# Patient Record
Sex: MALE | Race: WHITE | NOT HISPANIC OR LATINO | Employment: OTHER | URBAN - METROPOLITAN AREA
[De-identification: names, ages, dates, MRNs, and addresses within clinical notes are randomized per-mention and may not be internally consistent; named-entity substitution may affect disease eponyms.]

---

## 2017-01-06 RX ORDER — LISINOPRIL 20 MG/1
20 TABLET ORAL DAILY
COMMUNITY

## 2017-01-06 RX ORDER — CLOPIDOGREL BISULFATE 75 MG/1
75 TABLET ORAL DAILY
COMMUNITY
End: 2017-01-09

## 2017-01-06 RX ORDER — ASPIRIN 325 MG
325 TABLET ORAL DAILY
COMMUNITY
End: 2017-01-09

## 2017-01-09 ENCOUNTER — OFFICE VISIT (OUTPATIENT)
Dept: CARDIAC SURGERY | Facility: CLINIC | Age: 57
End: 2017-01-09

## 2017-01-09 VITALS
DIASTOLIC BLOOD PRESSURE: 86 MMHG | TEMPERATURE: 97.9 F | RESPIRATION RATE: 20 BRPM | OXYGEN SATURATION: 96 % | SYSTOLIC BLOOD PRESSURE: 144 MMHG | WEIGHT: 259.2 LBS | HEART RATE: 86 BPM | HEIGHT: 73 IN | BODY MASS INDEX: 34.35 KG/M2

## 2017-01-09 DIAGNOSIS — I25.118 CORONARY ARTERY DISEASE OF NATIVE ARTERY OF NATIVE HEART WITH STABLE ANGINA PECTORIS (HCC): Primary | ICD-10-CM

## 2017-01-09 PROCEDURE — 99205 OFFICE O/P NEW HI 60 MIN: CPT | Performed by: THORACIC SURGERY (CARDIOTHORACIC VASCULAR SURGERY)

## 2017-01-09 RX ORDER — ROSUVASTATIN CALCIUM 10 MG/1
TABLET, COATED ORAL
Refills: 3 | COMMUNITY
Start: 2016-11-17 | End: 2017-01-09

## 2017-01-09 RX ORDER — LISINOPRIL 20 MG/1
20 TABLET ORAL
COMMUNITY
End: 2017-01-09 | Stop reason: ALTCHOICE

## 2017-01-09 RX ORDER — CHLORAL HYDRATE 500 MG
CAPSULE ORAL
COMMUNITY
End: 2022-02-08

## 2017-01-09 RX ORDER — OXYCODONE AND ACETAMINOPHEN 10; 325 MG/1; MG/1
TABLET ORAL
Refills: 0 | COMMUNITY
Start: 2016-12-18 | End: 2017-01-09

## 2017-01-09 RX ORDER — DOXEPIN HYDROCHLORIDE 6 MG/1
TABLET ORAL
Refills: 2 | COMMUNITY
Start: 2016-12-16

## 2017-01-09 RX ORDER — CLOPIDOGREL BISULFATE 75 MG/1
75 TABLET ORAL
COMMUNITY

## 2017-01-09 RX ORDER — ROSUVASTATIN CALCIUM 20 MG/1
20 TABLET, COATED ORAL
COMMUNITY

## 2017-01-09 RX ORDER — PRAVASTATIN SODIUM 20 MG
TABLET ORAL
Refills: 2 | COMMUNITY
Start: 2016-12-16 | End: 2017-01-09

## 2017-01-09 RX ORDER — MELOXICAM 7.5 MG/1
TABLET ORAL
Refills: 2 | COMMUNITY
Start: 2016-12-16 | End: 2017-01-09

## 2017-01-09 RX ORDER — METOPROLOL TARTRATE 100 MG/1
100 TABLET ORAL
COMMUNITY

## 2017-01-09 RX ORDER — MELOXICAM 15 MG/1
15 TABLET ORAL
COMMUNITY
Start: 2015-08-06

## 2017-01-11 ENCOUNTER — TELEPHONE (OUTPATIENT)
Dept: CARDIAC SURGERY | Facility: CLINIC | Age: 57
End: 2017-01-11

## 2017-01-11 NOTE — TELEPHONE ENCOUNTER
Called pt and left message to call us regarding his meds prior to surgery.      According to meds in EPIC:  Plavix--last dose 1/12/17  Lisinopril--last dose 1/16/2017  Meloxicam--last dose 1/12/2017  Fish oils--stop now

## 2017-01-16 ENCOUNTER — HOSPITAL ENCOUNTER (OUTPATIENT)
Dept: CARDIOLOGY | Facility: HOSPITAL | Age: 57
Discharge: HOME | End: 2017-01-16
Attending: THORACIC SURGERY (CARDIOTHORACIC VASCULAR SURGERY) | Admitting: THORACIC SURGERY (CARDIOTHORACIC VASCULAR SURGERY)

## 2017-01-16 ENCOUNTER — TELEPHONE (OUTPATIENT)
Dept: CARDIAC SURGERY | Facility: CLINIC | Age: 57
End: 2017-01-16

## 2017-01-16 LAB
ABO + RH BLD: NORMAL
ALBUMIN SERPL-MCNC: 3.9 G/DL (ref 3.5–4.8)
ALBUMIN/GLOB SERPL: 1.5 {RATIO} (ref 1–1.7)
ALP SERPL-CCNC: 131 IU/L (ref 32–91)
ALT SERPL-CCNC: 37 IU/L (ref 17–63)
ANION GAP SERPL CALC-SCNC: 12.6 MMOL/L (ref 10–20)
APTT BLD: 25.9 SEC (ref 24–31)
ARMBAND: NORMAL
AST SERPL-CCNC: 31 IU/L (ref 15–41)
BACTERIA SPEC AEROBE CULT: NORMAL
BASE EXCESS BLDA CALC-SCNC: 2.3 MMOL/L (ref 0–3)
BASOPHILS # BLD AUTO: 0 10*3/UL (ref 0–0.2)
BASOPHILS NFR BLD AUTO: 1 % (ref 0–2)
BILIRUB SERPL-MCNC: 0.5 MG/DL (ref 0.3–1.2)
BILIRUB UR QL STRIP: NEGATIVE MG/DL
BLD COMPONENT TYPE: NORMAL
BLD GP AB SCN SERPL QL: NEGATIVE
BPU ID: NORMAL
BPU ID: NORMAL
BUN SERPL-MCNC: 10 MG/DL (ref 8–20)
BUN/CREAT SERPL: 12.5 (ref 6.2–20.3)
CALCIUM SERPL-MCNC: 10 MG/DL (ref 8.9–10.3)
CASTS URNS QL MICRO: ABNORMAL /[LPF]
CHLORIDE SERPL-SCNC: 98 MMOL/L (ref 101–111)
CO2 BLDA-SCNC: 27.9 MMOL/L (ref 22–29)
COLOR UR: YELLOW
CONV ADP AGGREGATION, % PLATELET: 94 % (ref 86–100)
CONV ALLEN'S TEST: POSITIVE
CONV BACTERIA IN URINE MICRO: NEGATIVE
CONV CLARITY OF URINE: CLEAR
CONV CO2: 29 MMOL/L (ref 22–32)
CONV DEVICE: ABNORMAL
CONV HEMODILUTION: NO
CONV HYALINE CASTS IN URINE MICRO: 0 /[LPF] (ref 0–5)
CONV PRODUCT 1 STATUS: NORMAL
CONV PRODUCT 1 STATUS: NORMAL
CONV PROTEIN IN URINE BY AUTOMATED TEST STRIP: NEGATIVE MG/DL
CONV SITE: ABNORMAL
CONV SMALL ROUND CELLS: ABNORMAL /[HPF]
CONV TOTAL PROTEIN: 6.5 G/DL (ref 6.1–7.9)
CONV UROBILINOGEN IN URINE BY AUTOMATED TEST STRIP: 1 MG/DL
CREAT UR-MCNC: 0.8 MG/DL (ref 0.7–1.2)
CROSSMATCH EXPIRATION: NORMAL
CROSSMATCH INTERPRETATION: NORMAL
CROSSMATCH INTERPRETATION: NORMAL
CULTURE INDICATED?: ABNORMAL
DIFFERENTIAL METHOD BLD: (no result)
EOSINOPHIL # BLD AUTO: 0 10*3/UL (ref 0–0.3)
EOSINOPHIL # BLD AUTO: 1 % (ref 0–3)
ERYTHROCYTE [DISTWIDTH] IN BLOOD BY AUTOMATED COUNT: 13 % (ref 11.5–14.5)
GLOBULIN UR ELPH-MCNC: 2.6 G/DL (ref 2.5–3.8)
GLUCOSE SERPL-MCNC: 298 MG/DL (ref 65–99)
GLUCOSE UR QL: >1000 MG/DL
HCO3 BLDA-SCNC: 26.7 MMOL/L (ref 21–28)
HCT VFR BLD AUTO: 49.3 % (ref 40–54)
HGB BLD-MCNC: 16.8 G/DL (ref 14–18)
HGB UR QL STRIP: NEGATIVE
INR PPP: 0.8
KETONES UR QL STRIP: NEGATIVE MG/DL
LEUKOCYTE ESTERASE UR QL STRIP: NEGATIVE
LYMPHOCYTES # BLD AUTO: 1.5 10*3/UL (ref 0.8–4.8)
LYMPHOCYTES NFR BLD AUTO: 37 % (ref 18–42)
Lab: NORMAL
MCH RBC QN AUTO: 31.6 PG (ref 26–32)
MCHC RBC AUTO-ENTMCNC: 34.2 G/DL (ref 32–36)
MCV RBC AUTO: 92.5 FL (ref 80–94)
MICRO REPORT STATUS: NORMAL
MONOCYTES # BLD AUTO: 0.3 10*3/UL (ref 0.1–1.3)
MONOCYTES NFR BLD AUTO: 6 % (ref 2–11)
NEUTROPHILS # BLD AUTO: 2.3 10*3/UL (ref 2.3–8.6)
NEUTROPHILS NFR BLD AUTO: 55 % (ref 50–75)
NITRITE UR QL STRIP: NEGATIVE
NRBC BLD AUTO-RTO: 0 /100{WBCS}
NRBC/RBC NFR BLD MANUAL: 0 10*3/UL
NUM BPU REQUESTED: 2
PCO2 BLDA: 39.9 MM[HG] (ref 35–48)
PH BLDA: 7.43 [PH] (ref 7.35–7.45)
PH UR STRIP.AUTO: 6 [PH] (ref 4.5–8)
PLATELET # BLD AUTO: 136 10*3/UL (ref 150–450)
PMV BLD AUTO: 8.4 FL (ref 7.4–10.4)
PO2 BLD: 53.2 MM[HG] (ref 83–108)
POTASSIUM SERPL-SCNC: 4.6 MMOL/L (ref 3.6–5.1)
PRE-HGB: 16.8 MG/DL
PREALB SERPL-MCNC: NORMAL MG/DL (ref 16–38)
PROTHROMBIN TIME: 10.9 SEC (ref 9.6–11.7)
RBC # BLD AUTO: 5.33 10*6/UL (ref 4.6–6)
RBC #/AREA URNS HPF: 1 /[HPF] (ref 0–3)
SAO2 % BLDCOA: 88 % (ref 94–98)
SODIUM SERPL-SCNC: 135 MMOL/L (ref 136–144)
SP GR UR: 1.04 (ref 1–1.03)
SPECIMEN SOURCE: ABNORMAL
SPECIMEN SOURCE: NORMAL
SPECIMEN TYPE: ABNORMAL
SPERM URNS QL MICRO: ABNORMAL /[HPF]
SQUAMOUS SPT QL MICRO: 0 /[HPF] (ref 0–5)
TRANS STATUS: NORMAL
TRANS STATUS: NORMAL
UNIDENT CRYS URNS QL MICRO: ABNORMAL /[HPF]
UNIT DIVISION: 0
UNIT DIVISION: 0
WBC # BLD AUTO: 4.2 10*3/UL (ref 4.5–11.5)
WBC #/AREA URNS HPF: 0 /[HPF] (ref 0–5)
YEAST SPEC QL WET PREP: ABNORMAL /[HPF]

## 2017-01-16 NOTE — TELEPHONE ENCOUNTER
Dr Wallace called to make DR White that this patient had a 15cm lung nodule . It was seen on imaging in 2015 and 2016 he feels it is most likely benign but wanted to make sure Dr Rivera was aware of it and suggested he might want to have a CT of the chest completed prior to surgery. I forwarded this message to Dr White

## 2017-01-17 ENCOUNTER — TELEPHONE (OUTPATIENT)
Dept: CARDIAC SURGERY | Facility: CLINIC | Age: 57
End: 2017-01-17

## 2017-01-17 NOTE — TELEPHONE ENCOUNTER
Mr Nuñez called back to let us know he wanted to reschedule the CT test scheduled for today. He can have it completed either Thur or Friday of this week. We will attempt to reschedule and call him back. He wants to see Dr White in office to discuss after the test has been completed. We will schedule an office appointment after the testing has been scheduled

## 2017-01-17 NOTE — TELEPHONE ENCOUNTER
Dr White asked that we obtain a CT of the chest for this patient after he heard from Dr Wallace yesterday. I scheduled the test then called the patient to confirm the time. I spoke to his wife at first and she agreed the time was suitable we were then disconnected. I called back Mr Nuñez answered and stated that he had several questions he wanted Dr White to answer. I asked if I could forward them to Dr White and the patient replied that he did not want surgery at this time and would not be having surgery until after the first. I was asking him to clarify that he did not want surgery at this time and he hung up while I was talking.

## 2017-01-18 ENCOUNTER — TELEPHONE (OUTPATIENT)
Dept: CARDIAC SURGERY | Facility: CLINIC | Age: 57
End: 2017-01-18

## 2017-01-18 NOTE — TELEPHONE ENCOUNTER
I called to let Mr Nuñez know his CT is scheduled for 1/19/17 at 12;30 and he will follow up with Dr White in office 1/30/17 at 12:00. These times were agreeable to him

## 2017-01-19 ENCOUNTER — HOSPITAL ENCOUNTER (OUTPATIENT)
Dept: GENERAL RADIOLOGY | Facility: HOSPITAL | Age: 57
Discharge: HOME | End: 2017-01-19
Attending: THORACIC SURGERY (CARDIOTHORACIC VASCULAR SURGERY) | Admitting: THORACIC SURGERY (CARDIOTHORACIC VASCULAR SURGERY)

## 2017-01-31 ENCOUNTER — OFFICE VISIT (OUTPATIENT)
Dept: CARDIAC SURGERY | Facility: CLINIC | Age: 57
End: 2017-01-31

## 2017-01-31 VITALS
HEART RATE: 86 BPM | DIASTOLIC BLOOD PRESSURE: 92 MMHG | SYSTOLIC BLOOD PRESSURE: 136 MMHG | WEIGHT: 256.2 LBS | BODY MASS INDEX: 33.95 KG/M2 | RESPIRATION RATE: 20 BRPM | HEIGHT: 73 IN | TEMPERATURE: 98 F | OXYGEN SATURATION: 96 %

## 2017-01-31 DIAGNOSIS — I25.118 CORONARY ARTERY DISEASE INVOLVING NATIVE CORONARY ARTERY OF NATIVE HEART WITH OTHER FORM OF ANGINA PECTORIS (HCC): Primary | ICD-10-CM

## 2017-01-31 PROCEDURE — 99212 OFFICE O/P EST SF 10 MIN: CPT | Performed by: THORACIC SURGERY (CARDIOTHORACIC VASCULAR SURGERY)

## 2017-01-31 RX ORDER — OXYCODONE AND ACETAMINOPHEN 10; 325 MG/1; MG/1
TABLET ORAL
Refills: 0 | COMMUNITY
Start: 2017-01-17 | End: 2017-03-23 | Stop reason: ALTCHOICE

## 2017-01-31 NOTE — PROGRESS NOTES
Mr. Nuñez's preop Cxray showed a left lung calcified nodule and a small RUL noncalcified nodule. Subsequent CT scan confirms these findings. I explained to him that he may require another CT scan in 6 months.    AVSS and WNL    NCAT  CTA B/L  RRR, no murmur  Obese  GCS 15, alert and oriented x3    We plan to perform his coronary artery bypass next Monday 2/6/17. He is currently on Plavix, which we instructed him to hold beginning Thursday 2/2/17.

## 2017-01-31 NOTE — MR AVS SNAPSHOT
Christopher Nuñez   2017 12:45 PM   Office Visit    Dept Phone:  [phone]   Encounter #:  [id number]    Provider:  Byron White MD   Department:  [facility] CARDIAC SURGERY                Your Full Care Plan              Your Updated Medication List          This list is accurate as of: 17  3:18 PM.  Always use your most recent med list.                aspirin 81 MG tablet       clopidogrel 75 MG tablet   Commonly known as:  PLAVIX       fish oil 1000 MG capsule capsule       lisinopril 20 MG tablet   Commonly known as:  PRINIVIL,ZESTRIL       meloxicam 15 MG tablet   Commonly known as:  MOBIC       metoprolol tartrate 100 MG tablet   Commonly known as:  LOPRESSOR       oxyCODONE-acetaminophen  MG per tablet   Commonly known as:  PERCOCET       rosuvastatin 20 MG tablet   Commonly known as:  CRESTOR       SILENOR 6 MG tablet   Generic drug:  Doxepin HCl               You Were Diagnosed With        Codes Comments    Coronary artery disease involving native coronary artery of native heart with other form of angina pectoris    -  Primary ICD-10-CM: I25.118       Instructions     None    Patient Instructions History      Upcoming Appointments     Visit Type Date Time Department    OFFICE VISIT 2017 12:45 PM [facility] [place]      AssertID Signup     [facility] AssertID allows you to send messages to your doctor, view your test results, renew your prescriptions, schedule appointments, and more. To sign up, go to Interview and click on the Sign Up Now link in the New User? box. Enter your Assert[employer] Activation Code exactly as it appears below along with the last four digits of your Social Security Number and your Date of Birth () to complete the sign-up process. If you do not sign up before the expiration date, you must request a new code.    AssertID Activation Code: [email or web address]  Expires: 2017  3:18 PM    If you have  "questions, you can email [email or web address] or call [phone] to talk to our MyChart staff. Remember, [employer] is NOT to be used for urgent needs. For medical emergencies, dial 911.               Other Info from Your Visit           Allergies     Ascorbate      Morphine      Morphine And Related        Vital Signs     Blood Pressure Pulse Temperature Respirations Height Weight    136/92 (BP Location: Right arm, Patient Position: Sitting, Cuff Size: Adult) 86 98 °F (36.7 °C) (Oral) 20 73\" (185.4 cm) 256 lb 3.2 oz (116 kg)    Oxygen Saturation Body Mass Index Smoking Status             96% 33.8 kg/m2 Current Every Day Smoker         Problems and Diagnoses Noted     Coronary artery disease involving native coronary artery of native heart with other form of angina pectoris    -  Primary        "

## 2017-02-01 ENCOUNTER — TELEPHONE (OUTPATIENT)
Dept: CARDIAC SURGERY | Facility: CLINIC | Age: 57
End: 2017-02-01

## 2017-02-01 NOTE — PROGRESS NOTES
BCS CONSULT    Reason for Consultation:Surgical revascularization    History of Present Illness:     This is a pleasant 56 year old man with a well-established history of CAD, having undergone multiple PCI procedures (most recently 2008). He has suffered recurrent, reproducible anginal symptoms (substernal discomfort) as well as dyspnea and dizziness over the last three months.    LHC on 12/29/16 by Dr. Pandey showed progressive CAD, with a 100% RCA lesion, a 90% OM lesion, a 70% Ramus lesion and an 80% in-stent LAD lesion. The patient has classic three vessel CAD and has been referred for possible surgical revascularization.                                                   Review of Systems   Constitutional: Positive for activity change and fatigue. Negative for appetite change, chills and diaphoresis.   HENT: Negative for hearing loss, nosebleeds, postnasal drip, rhinorrhea, sore throat, trouble swallowing and voice change.    Eyes: Negative for visual disturbance.   Respiratory: Positive for chest tightness and shortness of breath. Negative for apnea, cough, choking, wheezing and stridor.    Cardiovascular: Positive for chest pain and leg swelling. Negative for palpitations.   Gastrointestinal: Negative for abdominal distention, abdominal pain, anal bleeding, blood in stool, constipation, nausea, rectal pain and vomiting.   Endocrine: Negative for cold intolerance and heat intolerance.   Genitourinary: Negative for dysuria, flank pain, frequency and urgency.   Musculoskeletal: Negative for arthralgias, back pain, gait problem, joint swelling, myalgias, neck pain and neck stiffness.   Skin: Negative for pallor.   Allergic/Immunologic: Negative for environmental allergies, food allergies and immunocompromised state.   Neurological: Positive for dizziness and weakness. Negative for tremors, seizures, syncope, speech difficulty, light-headedness, numbness and headaches.   Hematological: Negative for adenopathy. Does  "not bruise/bleed easily.   Psychiatric/Behavioral: Negative for agitation, behavioral problems, confusion, decreased concentration, dysphoric mood and hallucinations. The patient is not hyperactive.         Past Medical History   Diagnosis Date   • COPD (chronic obstructive pulmonary disease)    • Coronary artery disease    • Diabetes mellitus    • Dyslipidemia    • Hypertension    • Myocardial infarction    • Shoulder pain    • Syncope      Past Surgical History   Procedure Laterality Date   • Coronary angioplasty     • Hernia repair       Family History   Problem Relation Age of Onset   • Cancer Mother    • Dementia Father      Social History   Substance Use Topics   • Smoking status: Current Every Day Smoker   • Smokeless tobacco: None   • Alcohol use No       (Not in a hospital admission)    Allergies:  Ascorbate; Morphine; and Morphine and related    Objective      Vital Signs       Flowsheet Rows         First Filed Value    Admission Height  73\" (185.4 cm) Documented at 01/09/2017 1231    Admission Weight  259 lb 3.2 oz (118 kg) Documented at 01/09/2017 1231        73\" (185.4 cm)    Physical Exam   Constitutional: He is oriented to person, place, and time. He appears well-developed and well-nourished. No distress.   HENT:   Head: Normocephalic and atraumatic.   Mouth/Throat: No oropharyngeal exudate.   Eyes: Conjunctivae and EOM are normal. Pupils are equal, round, and reactive to light. No scleral icterus.   Neck: Normal range of motion. Neck supple. No JVD present. No tracheal deviation present. No thyromegaly present.   Cardiovascular: Normal rate, regular rhythm and normal heart sounds.  Exam reveals no gallop and no friction rub.    Pulses:       Radial pulses are 2+ on the right side, and 2+ on the left side.        Femoral pulses are 2+ on the right side, and 2+ on the left side.       Dorsalis pedis pulses are 1+ on the right side, and 1+ on the left side.   Pulmonary/Chest: Effort normal. He has " rales.   Abdominal: Soft. Bowel sounds are normal. He exhibits no mass. No hernia.   Obese   Musculoskeletal: Normal range of motion. He exhibits no edema or deformity.   Lymphadenopathy:     He has no cervical adenopathy.   Neurological: He is alert and oriented to person, place, and time. No cranial nerve deficit. Coordination normal. GCS eye subscore is 4. GCS verbal subscore is 5. GCS motor subscore is 6.   Skin: Skin is warm and dry. No rash noted. He is not diaphoretic. No erythema. No pallor.   Psychiatric: He has a normal mood and affect. His behavior is normal. Judgment and thought content normal.       Results Review:       Assessment/Plan:     55 year old man with progressive CAD, having undergone multiple PCIs in past. He qualifies for surgical revascularization. We will perform this on an elective basis in early 2017.      Byron White MD  02/01/17  12:48 PM

## 2017-02-06 ENCOUNTER — OUTSIDE FACILITY SERVICE (OUTPATIENT)
Dept: CARDIAC SURGERY | Facility: CLINIC | Age: 57
End: 2017-02-06

## 2017-02-06 ENCOUNTER — PREP FOR SURGERY (OUTPATIENT)
Dept: CARDIAC SURGERY | Facility: CLINIC | Age: 57
End: 2017-02-06

## 2017-02-06 DIAGNOSIS — I25.118 CORONARY ARTERY DISEASE OF NATIVE ARTERY OF NATIVE HEART WITH STABLE ANGINA PECTORIS (HCC): Primary | ICD-10-CM

## 2017-02-06 PROCEDURE — 76998 US GUIDE INTRAOP: CPT | Performed by: THORACIC SURGERY (CARDIOTHORACIC VASCULAR SURGERY)

## 2017-02-06 PROCEDURE — 33533 CABG ARTERIAL SINGLE: CPT | Performed by: THORACIC SURGERY (CARDIOTHORACIC VASCULAR SURGERY)

## 2017-02-06 PROCEDURE — 33508 ENDOSCOPIC VEIN HARVEST: CPT | Performed by: THORACIC SURGERY (CARDIOTHORACIC VASCULAR SURGERY)

## 2017-02-06 PROCEDURE — 33519 CABG ARTERY-VEIN THREE: CPT | Performed by: THORACIC SURGERY (CARDIOTHORACIC VASCULAR SURGERY)

## 2017-03-09 ENCOUNTER — HOSPITAL ENCOUNTER (OUTPATIENT)
Dept: PAIN MEDICINE | Facility: HOSPITAL | Age: 57
Discharge: HOME | End: 2017-03-09
Attending: PHYSICAL MEDICINE & REHABILITATION | Admitting: PHYSICAL MEDICINE & REHABILITATION

## 2017-03-23 ENCOUNTER — OFFICE VISIT (OUTPATIENT)
Dept: CARDIAC SURGERY | Facility: CLINIC | Age: 57
End: 2017-03-23

## 2017-03-23 VITALS
DIASTOLIC BLOOD PRESSURE: 86 MMHG | HEART RATE: 90 BPM | TEMPERATURE: 98 F | SYSTOLIC BLOOD PRESSURE: 138 MMHG | RESPIRATION RATE: 20 BRPM | BODY MASS INDEX: 34.01 KG/M2 | WEIGHT: 256.6 LBS | OXYGEN SATURATION: 95 % | HEIGHT: 73 IN

## 2017-03-23 DIAGNOSIS — Z95.1 H/O CORONARY ARTERY BYPASS SURGERY: Primary | ICD-10-CM

## 2017-03-23 PROCEDURE — 99024 POSTOP FOLLOW-UP VISIT: CPT | Performed by: THORACIC SURGERY (CARDIOTHORACIC VASCULAR SURGERY)

## 2017-03-23 NOTE — PROGRESS NOTES
Christopher Nuñez is a 56 y.o. male s/p CABG. He denies any signs or symptoms of myocardial ischemia, cerebrovascular event, or infection. He reports good exercise tolerance.    Sternum is stable, incision is clean, dry, and intact.   CTA B/L.   Lower extremity incisions are clean, dry and intact.  GCS 15, no neurologic deficit.    He appears to be doing well. Follow up with us will be on a PRN basis.

## 2017-06-06 ENCOUNTER — HOSPITAL ENCOUNTER (OUTPATIENT)
Dept: PAIN MEDICINE | Facility: HOSPITAL | Age: 57
Discharge: HOME | End: 2017-06-06
Attending: PHYSICAL MEDICINE & REHABILITATION | Admitting: PHYSICAL MEDICINE & REHABILITATION

## 2017-06-19 ENCOUNTER — OFFICE VISIT (OUTPATIENT)
Dept: CARDIAC SURGERY | Facility: CLINIC | Age: 57
End: 2017-06-19

## 2017-06-19 DIAGNOSIS — T81.32XA STERNAL WOUND DEHISCENCE, INITIAL ENCOUNTER: ICD-10-CM

## 2017-06-19 DIAGNOSIS — Z95.1 H/O CORONARY ARTERY BYPASS SURGERY: Primary | ICD-10-CM

## 2017-06-19 PROCEDURE — 99215 OFFICE O/P EST HI 40 MIN: CPT | Performed by: THORACIC SURGERY (CARDIOTHORACIC VASCULAR SURGERY)

## 2017-07-31 ENCOUNTER — HOSPITAL ENCOUNTER (OUTPATIENT)
Dept: PAIN MEDICINE | Facility: HOSPITAL | Age: 57
Discharge: HOME | End: 2017-07-31
Attending: PHYSICAL MEDICINE & REHABILITATION | Admitting: PHYSICAL MEDICINE & REHABILITATION

## 2017-07-31 ENCOUNTER — TELEPHONE (OUTPATIENT)
Dept: CARDIAC SURGERY | Facility: CLINIC | Age: 57
End: 2017-07-31

## 2017-07-31 NOTE — TELEPHONE ENCOUNTER
I have spoken to Mrs Nuñez several times in the last few days concerning her husbands sternal wires. Dr White has planned to remove the broken wires but recently patient had an ER visit and more wires are fractured. They have been asking if Dr White would be willing to move up the surgery date. He has considered this and will plan on surgery 8/4/17 which the family is agreeable to

## 2017-07-31 NOTE — TELEPHONE ENCOUNTER
Called [place] [employer] @ [phone] and spoke with Blaire BENITES. She stated that CPT code 75298 does not require auth for outpatient or observation up to 72 hours. There was not a reference number given for the call as the name will suffice.

## 2017-08-01 ENCOUNTER — TELEPHONE (OUTPATIENT)
Dept: CARDIAC SURGERY | Facility: CLINIC | Age: 57
End: 2017-08-01

## 2017-08-01 NOTE — TELEPHONE ENCOUNTER
I spoke with Mrs Nuñez 7/31/17 and let her know her  needed to stop his Plavix today. He will be advised when to restart it after his scheduled surgery on 8/4/17

## 2017-08-03 ENCOUNTER — HOSPITAL ENCOUNTER (OUTPATIENT)
Dept: PREADMISSION TESTING | Facility: HOSPITAL | Age: 57
Discharge: HOME | End: 2017-08-03
Attending: THORACIC SURGERY (CARDIOTHORACIC VASCULAR SURGERY) | Admitting: THORACIC SURGERY (CARDIOTHORACIC VASCULAR SURGERY)

## 2017-08-03 LAB
ABO + RH BLD: NORMAL
ALBUMIN SERPL-MCNC: 3.9 G/DL (ref 3.5–4.8)
ALBUMIN/GLOB SERPL: 1.4 {RATIO} (ref 1–1.7)
ALP SERPL-CCNC: 107 IU/L (ref 32–91)
ALT SERPL-CCNC: 29 IU/L (ref 17–63)
ANION GAP SERPL CALC-SCNC: 12.5 MMOL/L (ref 10–20)
APTT BLD: 24 SEC (ref 24–31)
ARMBAND: NORMAL
AST SERPL-CCNC: 28 IU/L (ref 15–41)
BACTERIA SPEC AEROBE CULT: NORMAL
BASOPHILS # BLD AUTO: 0 10*3/UL (ref 0–0.2)
BASOPHILS NFR BLD AUTO: 1 % (ref 0–2)
BILIRUB SERPL-MCNC: 0.7 MG/DL (ref 0.3–1.2)
BILIRUB UR QL STRIP: NEGATIVE MG/DL
BLD COMPONENT TYPE: NORMAL
BLD GP AB SCN SERPL QL: NEGATIVE
BUN SERPL-MCNC: 10 MG/DL (ref 8–20)
BUN/CREAT SERPL: 12.5 (ref 6.2–20.3)
CALCIUM SERPL-MCNC: 9.9 MG/DL (ref 8.9–10.3)
CASTS URNS QL MICRO: ABNORMAL /[LPF]
CHLORIDE SERPL-SCNC: 102 MMOL/L (ref 101–111)
COLOR UR: YELLOW
CONV ADP AGGREGATION, % PLATELET: 88 % (ref 86–100)
CONV BACTERIA IN URINE MICRO: NEGATIVE
CONV CLARITY OF URINE: CLEAR
CONV CO2: 25 MMOL/L (ref 22–32)
CONV HYALINE CASTS IN URINE MICRO: 0 /[LPF] (ref 0–5)
CONV PROTEIN IN URINE BY AUTOMATED TEST STRIP: NEGATIVE MG/DL
CONV SMALL ROUND CELLS: ABNORMAL /[HPF]
CONV TOTAL PROTEIN: 6.7 G/DL (ref 6.1–7.9)
CONV UROBILINOGEN IN URINE BY AUTOMATED TEST STRIP: 0.2 MG/DL
CREAT UR-MCNC: 0.8 MG/DL (ref 0.7–1.2)
CROSSMATCH EXPIRATION: NORMAL
CULTURE INDICATED?: ABNORMAL
DIFFERENTIAL METHOD BLD: (no result)
EOSINOPHIL # BLD AUTO: 0 10*3/UL (ref 0–0.3)
EOSINOPHIL # BLD AUTO: 1 % (ref 0–3)
ERYTHROCYTE [DISTWIDTH] IN BLOOD BY AUTOMATED COUNT: 13.7 % (ref 11.5–14.5)
GLOBULIN UR ELPH-MCNC: 2.8 G/DL (ref 2.5–3.8)
GLUCOSE SERPL-MCNC: 222 MG/DL (ref 65–99)
GLUCOSE UR QL: >1000 MG/DL
HCT VFR BLD AUTO: 49.1 % (ref 40–54)
HGB BLD-MCNC: 16.8 G/DL (ref 14–18)
HGB UR QL STRIP: NEGATIVE
INR PPP: 0.9
KETONES UR QL STRIP: NEGATIVE MG/DL
LEUKOCYTE ESTERASE UR QL STRIP: NEGATIVE
LYMPHOCYTES # BLD AUTO: 1.5 10*3/UL (ref 0.8–4.8)
LYMPHOCYTES NFR BLD AUTO: 34 % (ref 18–42)
Lab: NORMAL
MCH RBC QN AUTO: 31.8 PG (ref 26–32)
MCHC RBC AUTO-ENTMCNC: 34.2 G/DL (ref 32–36)
MCV RBC AUTO: 93 FL (ref 80–94)
MICRO REPORT STATUS: NORMAL
MONOCYTES # BLD AUTO: 0.2 10*3/UL (ref 0.1–1.3)
MONOCYTES NFR BLD AUTO: 5 % (ref 2–11)
NEUTROPHILS # BLD AUTO: 2.6 10*3/UL (ref 2.3–8.6)
NEUTROPHILS NFR BLD AUTO: 59 % (ref 50–75)
NITRITE UR QL STRIP: NEGATIVE
NRBC BLD AUTO-RTO: 0 /100{WBCS}
NRBC/RBC NFR BLD MANUAL: 0 10*3/UL
PH UR STRIP.AUTO: 5.5 [PH] (ref 4.5–8)
PLATELET # BLD AUTO: 132 10*3/UL (ref 150–450)
PMV BLD AUTO: 8 FL (ref 7.4–10.4)
POTASSIUM SERPL-SCNC: 4.5 MMOL/L (ref 3.6–5.1)
PROTHROMBIN TIME: 10.5 SEC (ref 9.6–11.7)
RBC # BLD AUTO: 5.28 10*6/UL (ref 4.6–6)
RBC #/AREA URNS HPF: 1 /[HPF] (ref 0–3)
SODIUM SERPL-SCNC: 135 MMOL/L (ref 136–144)
SP GR UR: 1.04 (ref 1–1.03)
SPECIMEN SOURCE: ABNORMAL
SPECIMEN SOURCE: NORMAL
SPERM URNS QL MICRO: ABNORMAL /[HPF]
SQUAMOUS SPT QL MICRO: 0 /[HPF] (ref 0–5)
UNIDENT CRYS URNS QL MICRO: ABNORMAL /[HPF]
WBC # BLD AUTO: 4.4 10*3/UL (ref 4.5–11.5)
WBC #/AREA URNS HPF: 1 /[HPF] (ref 0–5)
YEAST SPEC QL WET PREP: ABNORMAL /[HPF]

## 2017-08-04 ENCOUNTER — OUTSIDE FACILITY SERVICE (OUTPATIENT)
Dept: CARDIAC SURGERY | Facility: CLINIC | Age: 57
End: 2017-08-04

## 2017-08-04 ENCOUNTER — HOSPITAL ENCOUNTER (OUTPATIENT)
Dept: PREOP | Facility: HOSPITAL | Age: 57
Setting detail: HOSPITAL OUTPATIENT SURGERY
Discharge: HOME | End: 2017-08-04
Attending: THORACIC SURGERY (CARDIOTHORACIC VASCULAR SURGERY) | Admitting: THORACIC SURGERY (CARDIOTHORACIC VASCULAR SURGERY)

## 2017-08-04 ENCOUNTER — TELEPHONE (OUTPATIENT)
Dept: CARDIAC SURGERY | Facility: CLINIC | Age: 57
End: 2017-08-04

## 2017-08-04 LAB — GLUCOSE BLD-MCNC: 176 MG/DL (ref 70–105)

## 2017-08-04 PROCEDURE — OUTSIDEPOS PR OUTSIDE POS PLACEHOLDER: Performed by: THORACIC SURGERY (CARDIOTHORACIC VASCULAR SURGERY)

## 2017-08-04 NOTE — TELEPHONE ENCOUNTER
After speaking with Chichi I called and discussed patient recent surgery with Cira GODFREY. I then called Mrs Nuñez who was upset because she felt they had been given conflicting information. Dr White will see patient back in office in 3 weeks, I scheduled a post op visit 8/28/17. I explained that Dr White completed the surgery with hopes that the lower portion of the sternum will heal. He would like to see patient back in 3 weeks to see if this is the case. He did explain the importance of following sternal precautions for that period of time and let the patient know that it was possible  the sternum might not heal properly and sternal plating might be a possibility in the future. Mrs Nuñez verbalized understanding but felt this was not what they had been told immediately following surgery by one of the nurses.The family did request a note for patients job which Cira GODFREY provided stating that the patient was not to work until seen after his 3 week post op appointment

## 2017-08-04 NOTE — PROGRESS NOTES
BCS CONSULT    Reason for Consultation: Sternal dehiscence.    History of Present Illness:     This is a 56 year old diabetic man with COPD, well known to our service, having undergone a CABG x 4 (with LIMA to LAD) on 2/6/17. His initial postop course appeared uneventful, despite the patient himself admitting to not following sternal precautions. He was re-advised to exercise sternal precautions, including not lifting anything over twenty pounds, until his sternal tenderness/pain had completely subsided.     Unfortunately, he continued to fail to protect his sternum. After working on his car engine, he felt a pop and now has severe lower sternal pain. He can feel his sternum moving inferiorly, especially when he coughs. His incision is intact, and there is no evidence of infection.    CT scan shows no underlying collection, no evidence of infection. The lower sternum is dehisced. Both CT and CXray confirm fracture of the lower sternal wires.    Review of Systems   Constitutional: Positive for activity change and appetite change. Negative for diaphoresis and fatigue.   HENT: Negative for hearing loss, nosebleeds, postnasal drip, rhinorrhea and trouble swallowing.    Eyes: Negative for visual disturbance.   Respiratory: Negative for apnea, cough, choking, chest tightness, shortness of breath, wheezing and stridor.    Cardiovascular: Negative for chest pain, palpitations and leg swelling.   Gastrointestinal: Negative for abdominal distention, abdominal pain, constipation, diarrhea, nausea and vomiting.   Genitourinary: Negative for dysuria.   Musculoskeletal: Negative for back pain, myalgias, neck pain and neck stiffness.   Skin: Negative for pallor, rash and wound.   Allergic/Immunologic: Negative for environmental allergies, food allergies and immunocompromised state.   Neurological: Negative for tremors, seizures, syncope, facial asymmetry, speech difficulty, weakness, light-headedness, numbness and headaches.    Hematological: Negative for adenopathy. Does not bruise/bleed easily.   Psychiatric/Behavioral: Positive for behavioral problems and sleep disturbance. Negative for confusion, self-injury and suicidal ideas. The patient is not nervous/anxious.         Past Medical History:   Diagnosis Date   • COPD (chronic obstructive pulmonary disease)    • Coronary artery disease    • Diabetes mellitus    • Dyslipidemia    • Hypertension    • Myocardial infarction    • Shoulder pain    • Syncope      Past Surgical History:   Procedure Laterality Date   • CORONARY ANGIOPLASTY     • CORONARY ARTERY BYPASS GRAFT  02/06/2017    x4  Dr White   • HERNIA REPAIR       Family History   Problem Relation Age of Onset   • Cancer Mother    • Dementia Father      Social History   Substance Use Topics   • Smoking status: Current Every Day Smoker   • Smokeless tobacco: Not on file   • Alcohol use No       (Not in a hospital admission)    Current Outpatient Prescriptions:   •  aspirin 81 MG tablet, Take 81 mg by mouth., Disp: , Rfl:   •  clopidogrel (PLAVIX) 75 MG tablet, Take 75 mg by mouth., Disp: , Rfl:   •  lisinopril (PRINIVIL,ZESTRIL) 20 MG tablet, Take 20 mg by mouth Daily., Disp: , Rfl:   •  meloxicam (MOBIC) 15 MG tablet, Take 15 mg by mouth., Disp: , Rfl:   •  metoprolol tartrate (LOPRESSOR) 100 MG tablet, Take 100 mg by mouth., Disp: , Rfl:   •  Omega-3 Fatty Acids (FISH OIL) 1000 MG capsule capsule, Take  by mouth., Disp: , Rfl:   •  rosuvastatin (CRESTOR) 20 MG tablet, Take 20 mg by mouth., Disp: , Rfl:   •  SILENOR 6 MG tablet, TAKE ONE (1) TABLET BY MOUTH EVERY NIGHT AT BEDTIME AS NEEDED FOR insomnia, Disp: , Rfl: 2    Allergies:  Ascorbate; Morphine; and Morphine and related    Objective      Vital Signs  BP: ()/()   Arterial Line BP: ()/()            Physical Exam   Constitutional: He is oriented to person, place, and time. He appears well-developed and well-nourished. No distress.   HENT:   Head: Normocephalic and atraumatic.    Mouth/Throat: No oropharyngeal exudate.   Eyes: EOM are normal. Pupils are equal, round, and reactive to light. No scleral icterus.   Neck: Normal range of motion. Neck supple. No JVD present. No tracheal deviation present. No thyromegaly present.   Cardiovascular: Normal rate and regular rhythm.    Murmur heard.   Systolic murmur is present with a grade of 2/6   Pulses:       Radial pulses are 2+ on the right side, and 2+ on the left side.        Femoral pulses are 2+ on the right side, and 2+ on the left side.       Dorsalis pedis pulses are 1+ on the right side, and 1+ on the left side.   Pulmonary/Chest: Effort normal and breath sounds normal. He has no rales. He exhibits tenderness.   Sternum unstable inferiorly. Sternal incision clean, dry, and intact.   Abdominal: Soft. Bowel sounds are normal. He exhibits no distension and no mass. There is no tenderness. No hernia.   Obese   Musculoskeletal: He exhibits no edema or deformity.   Lymphadenopathy:     He has no cervical adenopathy.   Neurological: He is alert and oriented to person, place, and time. No cranial nerve deficit.   Skin: Skin is warm and dry. He is not diaphoretic.   Psychiatric: He has a normal mood and affect. His behavior is normal. Judgment and thought content normal.       Results Review:     CT scan as above.        Assessment/Plan:     56 year old man with DM and COPD, clear history of noncompliance with medical/surgical instructions and a clear history of arguing with health staff. He is s/p CABG in February 2017. After working on his car engine, he has dehisced his sternum. There is no evidence of infection on exam or imaging.    We plan to proceed with a sternal re-wiring. He will require admission for a few days.      Byron White MD  08/04/17  2:21 AM

## 2017-10-02 ENCOUNTER — HOSPITAL ENCOUNTER (OUTPATIENT)
Dept: PAIN MEDICINE | Facility: HOSPITAL | Age: 57
Discharge: HOME | End: 2017-10-02
Attending: PHYSICAL MEDICINE & REHABILITATION | Admitting: PHYSICAL MEDICINE & REHABILITATION

## 2017-10-02 LAB
AMPHETAMINES UR QL SCN: NEGATIVE
BZE UR QL SCN: NEGATIVE
CREAT 24H UR-MCNC: ABNORMAL MG/DL
METHADONE UR QL SCN: NEGATIVE
OPIATE CONFIRMATION URINE: ABNORMAL
THC SERPLBLD CFM-MCNC: ABNORMAL NG/ML

## 2019-02-22 NOTE — TELEPHONE ENCOUNTER
Spoke to Mrs. Nuñez with information regarding her 's surgery. Donell has already contacted her with all the dates and times.  Per Cindy he is to hold his Plavix starting on 2-2-2017. Understood all.

## 2019-02-22 NOTE — TELEPHONE ENCOUNTER
Left message for patient or wife to call office regarding surgery that is scheduled for August 4.

## 2019-06-06 ENCOUNTER — CONVERSION ENCOUNTER (OUTPATIENT)
Dept: PAIN MEDICINE | Facility: CLINIC | Age: 59
End: 2019-06-06

## 2019-06-06 ENCOUNTER — HOSPITAL ENCOUNTER (OUTPATIENT)
Dept: PAIN MEDICINE | Facility: HOSPITAL | Age: 59
Discharge: HOME | End: 2019-06-06
Attending: PHYSICAL MEDICINE & REHABILITATION | Admitting: PHYSICAL MEDICINE & REHABILITATION

## 2019-06-06 LAB
AMPHETAMINES UR QL SCN: NEGATIVE
BARBITURATES UR QL SCN: NEGATIVE
BENZODIAZ UR QL SCN: NEGATIVE
BZE UR QL SCN: NEGATIVE
CREAT 24H UR-MCNC: 95.9 MG/DL
METHADONE UR QL SCN: NEGATIVE
OPIATE CONFIRMATION URINE: NORMAL
OPIATES TESTED UR SCN: NEGATIVE
PCP UR QL: NEGATIVE
THC SERPLBLD CFM-MCNC: NEGATIVE NG/ML

## 2019-06-11 ENCOUNTER — ON CAMPUS - OUTPATIENT (OUTPATIENT)
Dept: URBAN - METROPOLITAN AREA HOSPITAL 77 | Facility: HOSPITAL | Age: 59
End: 2019-06-11
Payer: COMMERCIAL

## 2019-06-11 DIAGNOSIS — I25.10 ATHEROSCLEROTIC HEART DISEASE OF NATIVE CORONARY ARTERY WITH: ICD-10-CM

## 2019-06-11 DIAGNOSIS — K92.1 MELENA: ICD-10-CM

## 2019-06-11 DIAGNOSIS — K25.9 GASTRIC ULCER, UNSPECIFIED AS ACUTE OR CHRONIC, WITHOUT HEMO: ICD-10-CM

## 2019-06-11 DIAGNOSIS — N28.9 DISORDER OF KIDNEY AND URETER, UNSPECIFIED: ICD-10-CM

## 2019-06-11 DIAGNOSIS — K26.9 DUODENAL ULCER, UNSPECIFIED AS ACUTE OR CHRONIC, WITHOUT HEM: ICD-10-CM

## 2019-06-11 DIAGNOSIS — D64.89 OTHER SPECIFIED ANEMIAS: ICD-10-CM

## 2019-06-11 DIAGNOSIS — E11.9 TYPE 2 DIABETES MELLITUS WITHOUT COMPLICATIONS: ICD-10-CM

## 2019-06-11 PROCEDURE — 43239 EGD BIOPSY SINGLE/MULTIPLE: CPT | Performed by: INTERNAL MEDICINE

## 2019-06-11 PROCEDURE — 43270 EGD LESION ABLATION: CPT | Mod: 59 | Performed by: INTERNAL MEDICINE

## 2019-06-11 PROCEDURE — 99204 OFFICE O/P NEW MOD 45 MIN: CPT | Mod: 25 | Performed by: NURSE PRACTITIONER

## 2019-06-13 VITALS
DIASTOLIC BLOOD PRESSURE: 90 MMHG | WEIGHT: 242 LBS | HEIGHT: 72 IN | RESPIRATION RATE: 16 BRPM | OXYGEN SATURATION: 98 % | HEART RATE: 68 BPM | BODY MASS INDEX: 32.78 KG/M2 | SYSTOLIC BLOOD PRESSURE: 149 MMHG

## 2019-07-01 ENCOUNTER — HOSPITAL ENCOUNTER (OUTPATIENT)
Dept: PAIN MEDICINE | Facility: HOSPITAL | Age: 59
Discharge: HOME | End: 2019-07-01
Admitting: PHYSICAL MEDICINE & REHABILITATION

## 2019-07-01 VITALS
OXYGEN SATURATION: 100 % | SYSTOLIC BLOOD PRESSURE: 120 MMHG | HEART RATE: 73 BPM | DIASTOLIC BLOOD PRESSURE: 77 MMHG | RESPIRATION RATE: 16 BRPM

## 2019-07-01 DIAGNOSIS — M67.912 ROTATOR CUFF DISORDER, LEFT: ICD-10-CM

## 2019-07-01 DIAGNOSIS — M54.5 CHRONIC MIDLINE LOW BACK PAIN, WITH SCIATICA PRESENCE UNSPECIFIED: Primary | ICD-10-CM

## 2019-07-01 DIAGNOSIS — M51.36 DEGENERATION OF INTERVERTEBRAL DISC OF LUMBAR REGION: ICD-10-CM

## 2019-07-01 DIAGNOSIS — M25.512 CHRONIC LEFT SHOULDER PAIN: ICD-10-CM

## 2019-07-01 DIAGNOSIS — G89.29 CHRONIC MIDLINE LOW BACK PAIN, WITH SCIATICA PRESENCE UNSPECIFIED: Primary | ICD-10-CM

## 2019-07-01 DIAGNOSIS — G89.29 CHRONIC LEFT SHOULDER PAIN: ICD-10-CM

## 2019-07-01 PROCEDURE — 20610 DRAIN/INJ JOINT/BURSA W/O US: CPT | Performed by: PHYSICAL MEDICINE & REHABILITATION

## 2019-07-01 PROCEDURE — 25010000002 METHYLPREDNISOLONE PER 40 MG

## 2019-07-01 RX ORDER — FENOFIBRATE 160 MG/1
160 TABLET ORAL DAILY
Refills: 3 | COMMUNITY
Start: 2019-06-21

## 2019-07-01 RX ORDER — OXYCODONE AND ACETAMINOPHEN 10; 325 MG/1; MG/1
1 TABLET ORAL EVERY 6 HOURS PRN
Qty: 120 TABLET | Refills: 0 | Status: SHIPPED | OUTPATIENT
Start: 2019-07-01 | End: 2019-09-05 | Stop reason: SDUPTHER

## 2019-07-01 RX ORDER — SUCRALFATE 1 G/1
1 TABLET ORAL 4 TIMES DAILY
Refills: 11 | COMMUNITY
Start: 2019-06-12

## 2019-07-01 RX ORDER — CYCLOBENZAPRINE HCL 10 MG
10 TABLET ORAL
COMMUNITY

## 2019-07-01 RX ORDER — LOSARTAN POTASSIUM 50 MG/1
TABLET ORAL
Refills: 3 | COMMUNITY
Start: 2019-06-21

## 2019-07-01 RX ORDER — BUPIVACAINE HYDROCHLORIDE 2.5 MG/ML
INJECTION, SOLUTION EPIDURAL; INFILTRATION; INTRACAUDAL
Status: DISCONTINUED
Start: 2019-07-01 | End: 2019-07-02 | Stop reason: HOSPADM

## 2019-07-01 RX ORDER — METOPROLOL SUCCINATE 50 MG/1
TABLET, EXTENDED RELEASE ORAL
COMMUNITY
Start: 2015-10-05

## 2019-07-01 RX ORDER — CHLORAL HYDRATE 500 MG
CAPSULE ORAL
COMMUNITY
End: 2022-02-08

## 2019-07-01 RX ORDER — OXYCODONE AND ACETAMINOPHEN 10; 325 MG/1; MG/1
1 TABLET ORAL EVERY 6 HOURS PRN
Qty: 120 TABLET | Refills: 0 | Status: SHIPPED | OUTPATIENT
Start: 2019-07-01 | End: 2019-07-01 | Stop reason: SDUPTHER

## 2019-07-01 RX ORDER — ASPIRIN 81 MG/1
TABLET ORAL
COMMUNITY
Start: 2015-10-05

## 2019-07-01 RX ORDER — METHYLPREDNISOLONE ACETATE 40 MG/ML
INJECTION, SUSPENSION INTRA-ARTICULAR; INTRALESIONAL; INTRAMUSCULAR; SOFT TISSUE
Status: DISCONTINUED
Start: 2019-07-01 | End: 2019-07-02 | Stop reason: HOSPADM

## 2019-07-01 RX ORDER — CLOPIDOGREL BISULFATE 75 MG/1
75 TABLET ORAL DAILY
COMMUNITY
Start: 2015-10-05

## 2019-07-01 RX ORDER — OXYCODONE HYDROCHLORIDE 30 MG/1
TABLET ORAL
Refills: 0 | COMMUNITY
Start: 2019-05-31 | End: 2019-07-01

## 2019-07-01 NOTE — PATIENT INSTRUCTIONS
Shoulder Injection, Care After  Refer to this sheet in the next few weeks. These instructions provide you with information about caring for yourself after your procedure. Your health care provider may also give you more specific instructions. Your treatment has been planned according to current medical practices, but problems sometimes occur. Call your health care provider if you have any problems or questions after your procedure.  What can I expect after the procedure?  After the procedure, it is common to have:  · Soreness.  · Warmth.  · Swelling.    You may have more pain, swelling, and warmth than you did before the injection. This reaction may last for about one day.  Follow these instructions at home:  Bathing  · If you were given a bandage (dressing), keep it dry until your health care provider says it can be removed. Ask your health care provider when you can start showering or taking a bath.  Managing pain, stiffness, and swelling  · If directed, apply ice to the injection area:  ? Put ice in a plastic bag.  ? Place a towel between your skin and the bag.  ? Leave the ice on for 20 minutes, 2-3 times per day.  · Do not apply heat to your knee.  · Raise the injection area above the level of your heart while you are sitting or lying down.  Activity  · Avoid strenuous activities for as long as directed by your health care provider. Ask your health care provider when you can return to your normal activities.  General instructions  · Take medicines only as directed by your health care provider.  · Do not take aspirin or other over-the-counter medicines unless your health care provider says you can.  · Check your injection site every day for signs of infection. Watch for:  ? Redness, swelling, or pain.  ? Fluid, blood, or pus.  · Follow your health care provider’s instructions about dressing changes and removal.  Contact a health care provider if:  · You have symptoms at your injection site that last longer than  two days after your procedure.  · You have redness, swelling, or pain in your injection area.  · You have fluid, blood, or pus coming from your injection site.  · You have warmth in your injection area.  · You have a fever.  · Your pain is not controlled with medicine.  Get help right away if:  · Your knee turns very red.  · Your knee becomes very swollen.  · Your knee pain is severe.  This information is not intended to replace advice given to you by your health care provider. Make sure you discuss any questions you have with your health care provider.  Document Released: 01/08/2016 Document Revised: 08/23/2017 Document Reviewed: 10/28/2015  [employer] Interactive Patient Education © 2018 [employer]

## 2019-07-01 NOTE — H&P
Patient Care Team:  Guilherme Spivey MD as PCP - General  Blaise Diehl MD as PCP - Family Medicine  Epifanio Pandey MD as Consulting Physician (Cardiology)    Chief complaint Left shoulder pain    Subjective     mid to lower back tail bone pain, recent bypass 2/6-discharged 2/9. rt shoulder and back pain, coccydynia. Referred for LBP, also now R shoulder and neck pain after fall. 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 10/10 at worst, 9/10 at best, 8/10 today. No weakness or numbness, no b/b incontinence. Has tried PT and TENS without benefit, Oyxcodone caused nausea, Hydrocodone 10mg helps, failed lower doses and Tramadol, OTC NSAIDs and tylenol, OTC creams. CT C-spine shows multilevel DDD and DJD, stenosis, no acute injury. Started Norco 10/325mg BID prn with benefit, some days could use 3, could not fill compounded cream due to cost, started Pennsaid with excellent benefit, Pennsaid denied, ordered diclofenac solution instead. Was taking Norco 10/325mg 2-3x/day with good benefit overall until he tore mesh from an umbilical hernia repair, will likely have surgery soon, then QID for hernia pain and working 60 hours/week. Became tolerant of Norco, rotated to Percocet 7.5/325mg QID which is less effective, then 10/325mg QID with Oxycontin 20mg qHS. New L RTC injury, needs Ortho, MRI pending, pain too severe at night. Taking Percocet 10mg QID prn. Had GI bleed from NSAIDs, stopped, doing better. Here for L subacromial injection.        Review of Systems   Constitutional: Negative for chills, fatigue and fever.   HENT: Positive for hearing loss. Negative for trouble swallowing.    Eyes: Positive for visual disturbance.   Respiratory: Positive for shortness of breath.    Cardiovascular: Positive for chest pain.   Gastrointestinal: Positive for abdominal pain and diarrhea. Negative for constipation,  nausea and vomiting.   Musculoskeletal: Positive for arthralgias, back pain and neck pain. Negative for joint swelling and myalgias.   Neurological: Negative for dizziness, weakness, numbness and headaches.        Past Medical History:   Diagnosis Date   • COPD (chronic obstructive pulmonary disease) (CMS/[facility])    • Coronary artery disease    • Diabetes mellitus (CMS/HCC)    • Dyslipidemia    • Hypertension    • Myocardial infarction (CMS/[facility])    • Shoulder pain    • Syncope      Past Surgical History:   Procedure Laterality Date   • CORONARY ANGIOPLASTY     • CORONARY ARTERY BYPASS GRAFT  02/06/2017    x4  Dr White   • HERNIA REPAIR     • STERNAL WIRE REMOVAL  08/04/2017    Dr White     Family History   Problem Relation Age of Onset   • Cancer Mother    • Dementia Father      Social History     Tobacco Use   • Smoking status: Current Every Day Smoker     Packs/day: 1.00     Years: 35.00     Pack years: 35.00     Types: Cigarettes   • Smokeless tobacco: Never Used   Substance Use Topics   • Alcohol use: No     Frequency: Never   • Drug use: No       (Not in a hospital admission)  Allergies:  Morphine; Strawberry extract; Ascorbate; and Morphine and related    Objective      Vital Signs       Physical Exam   Constitutional: He is oriented to person, place, and time. He appears well-developed and well-nourished.   HENT:   Head: Normocephalic and atraumatic.   Eyes: EOM are normal. Pupils are equal, round, and reactive to light.   Cardiovascular: Normal rate, regular rhythm and normal heart sounds.   Pulmonary/Chest: Effort normal and breath sounds normal.   Abdominal: Soft. Bowel sounds are normal. There is no tenderness.   Musculoskeletal:   L shoulder: in sling   Neurological: He is alert and oriented to person, place, and time. He displays normal reflexes. No sensory deficit.   Psychiatric: He has a normal mood and affect. His behavior is normal. Judgment and thought content normal.       Results Review:   None     "  Assessment/Plan       * No active hospital problems. *      Assessment & Plan    I discussed the patients findings and my recommendations with patient     Inspect reviewed, in order. Low risk. Repeat UDS 5/6/19 in order.  Cont Percocet 10/325mg QID prn. Given his highly varible pain level tied to activity and injury, plan to keep on short-acting opioids to allow him to minimize opioid use as tolerated. Added Oxycodone ER 20mg qHS.  Cont Tizanidine 4-8mg qHS prn muscle pain, insomnia, helping him sleep very well.  Cont other meds as prescribed.  Referral to Ortho for L RTC tear.  Perform L subacromial injection.  RTC 2 months for f/u.    Shoulder Subacromial Injection    PREOPERATIVE DIAGNOSIS: Shoulder pain    POSTOPERATIVE DIAGNOSIS: Shoulder pain    PROCEDURE PERFORMED: RIGHT SUBACROMIAL SHOULDER INJECTION    The patient understands the risks and benefits of the procedure and wishes to proceed. The patient was seen in the preoperative area. Patient's consent was obtained and updated. Vitals were taken. Patient was then placed in a seated position for the injection. Site marked for a lateral approach and prepped with alcohol swabs x 4. A 25 gauge 1.5\"  needle was introduced into the joint space and 3mL of steroid solution containing 2mL 0.25% bupivacaine, and 1mL 40mg Depomedrol was injected after negative aspiration without resistance. The patient tolerated with no jed-procedural complications.  A sterile dressing was placed over the puncture site.        Rojas Cochran MD  07/01/19  2:58 PM    Time: Discharge 15 min  "

## 2019-09-05 ENCOUNTER — OFFICE VISIT (OUTPATIENT)
Dept: PAIN MEDICINE | Facility: CLINIC | Age: 59
End: 2019-09-05

## 2019-09-05 VITALS
TEMPERATURE: 98.3 F | HEIGHT: 74 IN | SYSTOLIC BLOOD PRESSURE: 137 MMHG | RESPIRATION RATE: 16 BRPM | HEART RATE: 74 BPM | BODY MASS INDEX: 30.42 KG/M2 | OXYGEN SATURATION: 98 % | WEIGHT: 237 LBS | DIASTOLIC BLOOD PRESSURE: 84 MMHG

## 2019-09-05 DIAGNOSIS — M25.512 CHRONIC LEFT SHOULDER PAIN: ICD-10-CM

## 2019-09-05 DIAGNOSIS — G89.29 CHRONIC LEFT SHOULDER PAIN: ICD-10-CM

## 2019-09-05 DIAGNOSIS — G89.29 CHRONIC MIDLINE LOW BACK PAIN, WITH SCIATICA PRESENCE UNSPECIFIED: Primary | ICD-10-CM

## 2019-09-05 DIAGNOSIS — M54.5 CHRONIC MIDLINE LOW BACK PAIN, WITH SCIATICA PRESENCE UNSPECIFIED: Primary | ICD-10-CM

## 2019-09-05 DIAGNOSIS — M51.36 DEGENERATION OF INTERVERTEBRAL DISC OF LUMBAR REGION: ICD-10-CM

## 2019-09-05 DIAGNOSIS — M67.912 ROTATOR CUFF DISORDER, LEFT: ICD-10-CM

## 2019-09-05 PROCEDURE — 99213 OFFICE O/P EST LOW 20 MIN: CPT | Performed by: PHYSICAL MEDICINE & REHABILITATION

## 2019-09-05 PROCEDURE — G0463 HOSPITAL OUTPT CLINIC VISIT: HCPCS | Performed by: PHYSICAL MEDICINE & REHABILITATION

## 2019-09-05 RX ORDER — OXYCODONE AND ACETAMINOPHEN 10; 325 MG/1; MG/1
1 TABLET ORAL EVERY 6 HOURS PRN
Qty: 120 TABLET | Refills: 0 | Status: SHIPPED | OUTPATIENT
Start: 2019-09-05 | End: 2019-09-05 | Stop reason: SDUPTHER

## 2019-09-05 RX ORDER — OXYCODONE AND ACETAMINOPHEN 10; 325 MG/1; MG/1
1 TABLET ORAL EVERY 6 HOURS PRN
Qty: 120 TABLET | Refills: 0 | Status: SHIPPED | OUTPATIENT
Start: 2019-09-05 | End: 2019-12-02 | Stop reason: SDUPTHER

## 2019-09-05 NOTE — PROGRESS NOTES
Subjective   Christopher Nuñez is a 58 y.o. male.     mid to lower back tail bone pain, recent bypass 2/6-discharged 2/9. rt shoulder and back pain, coccydynia. Referred for LBP, also now R shoulder and neck pain after fall. 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 10/10 at worst, 9/10 at best, 8/10 today. No weakness or numbness, no b/b incontinence. Has tried PT and TENS without benefit, Oyxcodone caused nausea, Hydrocodone 10mg helps, failed lower doses and Tramadol, OTC NSAIDs and tylenol, OTC creams. CT C-spine shows multilevel DDD and DJD, stenosis, no acute injury. Started Norco 10/325mg BID prn with benefit, some days could use 3, could not fill compounded cream due to cost, started Pennsaid with excellent benefit, Pennsaid denied, ordered diclofenac solution instead. Was taking Norco 10/325mg 2-3x/day with good benefit overall until he tore mesh from an umbilical hernia repair, will likely have surgery soon, then QID for hernia pain and working 60 hours/week. Became tolerant of Norco, rotated to Percocet 7.5/325mg QID which is less effective, then 10/325mg QID. Normally effective, but chest still healing from CABG, went to ED, dz with broken wire, now all broken, repaired with sutures, broke, needs plate in Feb 2018. New L RTC injury, needs Ortho, MRI pending, pain too severe at night. Taking Percocet 10mg QID prn.         The following portions of the patient's history were reviewed and updated as appropriate: allergies, current medications, past family history, past medical history, past social history, past surgical history and problem list.    Review of Systems    Objective   Physical Exam      Assessment/Plan   Christopher was seen today for back pain.    Diagnoses and all orders for this visit:    Chronic midline low back pain, with sciatica presence unspecified    Chronic left shoulder  pain    Degeneration of intervertebral disc of lumbar region    Rotator cuff disorder, left        Inspect reviewed, in order. Low risk. Repeat UDS 6/6/19.  Cont Percocet 10/325mg #12. Given his highly varible pain level tied to activity and injury, plan to keep on short-acting opioids to allow him to minimize opioid use as tolerated. Added Oxycodone ER 20mg qHS, doing better, will use sparingly in future.  Cont Tizanidine 4-8mg qHS prn muscle pain, insomnia, helping him sleep very well.  Cont other meds as prescribed.  Referral to Ortho for L RTC tear.  Had L subacromial injection.  RTC 3 months for f/u.

## 2019-12-02 ENCOUNTER — OFFICE VISIT (OUTPATIENT)
Dept: PAIN MEDICINE | Facility: CLINIC | Age: 59
End: 2019-12-02

## 2019-12-02 VITALS
DIASTOLIC BLOOD PRESSURE: 75 MMHG | BODY MASS INDEX: 29.65 KG/M2 | HEART RATE: 76 BPM | OXYGEN SATURATION: 97 % | HEIGHT: 74 IN | TEMPERATURE: 98 F | WEIGHT: 231 LBS | SYSTOLIC BLOOD PRESSURE: 107 MMHG | RESPIRATION RATE: 16 BRPM

## 2019-12-02 DIAGNOSIS — G89.29 CHRONIC MIDLINE LOW BACK PAIN, UNSPECIFIED WHETHER SCIATICA PRESENT: ICD-10-CM

## 2019-12-02 DIAGNOSIS — M54.50 CHRONIC MIDLINE LOW BACK PAIN, UNSPECIFIED WHETHER SCIATICA PRESENT: ICD-10-CM

## 2019-12-02 DIAGNOSIS — G89.29 CHRONIC LEFT SHOULDER PAIN: Primary | ICD-10-CM

## 2019-12-02 DIAGNOSIS — M67.912 ROTATOR CUFF DISORDER, LEFT: ICD-10-CM

## 2019-12-02 DIAGNOSIS — M51.36 DEGENERATION OF INTERVERTEBRAL DISC OF LUMBAR REGION: ICD-10-CM

## 2019-12-02 DIAGNOSIS — M25.512 CHRONIC LEFT SHOULDER PAIN: Primary | ICD-10-CM

## 2019-12-02 PROCEDURE — 99213 OFFICE O/P EST LOW 20 MIN: CPT | Performed by: PHYSICAL MEDICINE & REHABILITATION

## 2019-12-02 PROCEDURE — G0463 HOSPITAL OUTPT CLINIC VISIT: HCPCS | Performed by: PHYSICAL MEDICINE & REHABILITATION

## 2019-12-02 RX ORDER — OXYCODONE AND ACETAMINOPHEN 10; 325 MG/1; MG/1
1 TABLET ORAL EVERY 6 HOURS PRN
Qty: 120 TABLET | Refills: 0 | Status: SHIPPED | OUTPATIENT
Start: 2019-12-02 | End: 2019-12-02 | Stop reason: SDUPTHER

## 2019-12-02 RX ORDER — OXYCODONE AND ACETAMINOPHEN 10; 325 MG/1; MG/1
1 TABLET ORAL EVERY 6 HOURS PRN
Qty: 120 TABLET | Refills: 0 | Status: SHIPPED | OUTPATIENT
Start: 2019-12-02 | End: 2020-02-28 | Stop reason: SDUPTHER

## 2019-12-02 NOTE — PROGRESS NOTES
Subjective   Christopher Nuñez is a 58 y.o. male.     mid to lower back tail bone pain, recent bypass 2/6-discharged 2/9. rt shoulder and back pain, coccydynia. Referred for LBP, also now R shoulder and neck pain after fall. 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 10/10 at worst, 9/10 at best, 8/10 today. No weakness or numbness, no b/b incontinence. Has tried PT and TENS without benefit, Oyxcodone caused nausea, Hydrocodone 10mg helps, failed lower doses and Tramadol, OTC NSAIDs and tylenol, OTC creams. CT C-spine shows multilevel DDD and DJD, stenosis, no acute injury. Started Norco 10/325mg BID prn with benefit, some days could use 3, could not fill compounded cream due to cost, started Pennsaid with excellent benefit, Pennsaid denied, ordered diclofenac solution instead. Was taking Norco 10/325mg 2-3x/day with good benefit overall until he tore mesh from an umbilical hernia repair, will likely have surgery soon, then QID for hernia pain and working 60 hours/week. Became tolerant of Norco, rotated to Percocet 7.5/325mg QID which is less effective, then 10/325mg QID. Normally effective, but chest still healing from CABG, went to ED, dz with broken wire, now all broken, repaired with sutures, broke, needs plate in Feb 2018. New L RTC injury, needs Ortho, MRI pending, pain too severe at night. Taking Percocet 10mg QID prn.         The following portions of the patient's history were reviewed and updated as appropriate: allergies, current medications, past family history, past medical history, past social history, past surgical history and problem list.    Review of Systems   Constitutional: Negative for chills, fatigue and fever.   HENT: Positive for hearing loss. Negative for trouble swallowing.    Eyes: Negative for visual disturbance.   Respiratory: Negative for shortness of breath.    Cardiovascular:  Negative for chest pain.   Gastrointestinal: Negative for abdominal pain, constipation, diarrhea, nausea and vomiting.   Genitourinary: Negative for urinary incontinence.   Musculoskeletal: Positive for arthralgias, back pain and neck pain. Negative for joint swelling and myalgias.   Neurological: Negative for dizziness, weakness, numbness and headache.       Objective   Physical Exam   Constitutional: He is oriented to person, place, and time. He appears well-developed and well-nourished.   HENT:   Head: Normocephalic and atraumatic.   Eyes: EOM are normal. Pupils are equal, round, and reactive to light.   Neck: Normal range of motion.   Cardiovascular: Normal rate, regular rhythm, normal heart sounds and intact distal pulses.   Pulmonary/Chest: Breath sounds normal.   Abdominal: Soft. Bowel sounds are normal. He exhibits no distension. There is no tenderness.   Neurological: He is alert and oriented to person, place, and time. He has normal strength and normal reflexes. He displays normal reflexes. No sensory deficit.   Psychiatric: He has a normal mood and affect. His behavior is normal. Thought content normal.         Assessment/Plan   Christopher was seen today for back pain.    Diagnoses and all orders for this visit:    Chronic left shoulder pain    Chronic midline low back pain, unspecified whether sciatica present    Degeneration of intervertebral disc of lumbar region    Rotator cuff disorder, left        Inspect reviewed, in order. Low risk. Repeat UDS 6/6/19.  Cont Percocet 10/325mg #120. Given his highly varible pain level tied to activity and injury, plan to keep on short-acting opioids to allow him to minimize opioid use as tolerated. Added Oxycodone ER 20mg qHS, doing better, will use sparingly in future.  Cont Tizanidine 4-8mg qHS prn muscle pain, insomnia, helping him sleep very well.  Cont other meds as prescribed.  Referral to Ortho for L RTC tear.  Had L subacromial injection.  RTC 3 months for  f/u.

## 2020-02-14 ENCOUNTER — HOSPITAL ENCOUNTER (OUTPATIENT)
Dept: GENERAL RADIOLOGY | Facility: HOSPITAL | Age: 60
Discharge: HOME | End: 2020-02-14
Admitting: INTERNAL MEDICINE

## 2020-02-14 ENCOUNTER — TRANSCRIBE ORDERS (OUTPATIENT)
Dept: ADMINISTRATIVE | Facility: HOSPITAL | Age: 60
End: 2020-02-14

## 2020-02-14 DIAGNOSIS — Z02.71 DISABILITY EXAMINATION: Primary | ICD-10-CM

## 2020-02-14 DIAGNOSIS — Z02.71 DISABILITY EXAMINATION: ICD-10-CM

## 2020-02-14 PROCEDURE — 72100 X-RAY EXAM L-S SPINE 2/3 VWS: CPT

## 2020-02-28 ENCOUNTER — OFFICE VISIT (OUTPATIENT)
Dept: PAIN MEDICINE | Facility: CLINIC | Age: 60
End: 2020-02-28

## 2020-02-28 VITALS
BODY MASS INDEX: 28.88 KG/M2 | HEIGHT: 74 IN | WEIGHT: 225 LBS | OXYGEN SATURATION: 97 % | DIASTOLIC BLOOD PRESSURE: 93 MMHG | TEMPERATURE: 97.9 F | RESPIRATION RATE: 16 BRPM | HEART RATE: 81 BPM | SYSTOLIC BLOOD PRESSURE: 142 MMHG

## 2020-02-28 DIAGNOSIS — M67.912 ROTATOR CUFF DISORDER, LEFT: ICD-10-CM

## 2020-02-28 DIAGNOSIS — M54.2 NECK PAIN: ICD-10-CM

## 2020-02-28 DIAGNOSIS — M51.36 DEGENERATION OF INTERVERTEBRAL DISC OF LUMBAR REGION: ICD-10-CM

## 2020-02-28 DIAGNOSIS — M75.102 ROTATOR CUFF SYNDROME, LEFT: ICD-10-CM

## 2020-02-28 DIAGNOSIS — G89.29 CHRONIC LEFT SHOULDER PAIN: Primary | ICD-10-CM

## 2020-02-28 DIAGNOSIS — G89.29 CHRONIC BILATERAL LOW BACK PAIN WITHOUT SCIATICA: ICD-10-CM

## 2020-02-28 DIAGNOSIS — M54.50 CHRONIC MIDLINE LOW BACK PAIN, UNSPECIFIED WHETHER SCIATICA PRESENT: ICD-10-CM

## 2020-02-28 DIAGNOSIS — M50.30 DEGENERATION OF INTERVERTEBRAL DISC OF CERVICAL REGION: ICD-10-CM

## 2020-02-28 DIAGNOSIS — R07.89 CHEST WALL PAIN: ICD-10-CM

## 2020-02-28 DIAGNOSIS — M54.2 PAIN OF CERVICAL FACET JOINT: ICD-10-CM

## 2020-02-28 DIAGNOSIS — M48.02 SPINAL STENOSIS OF CERVICAL REGION: ICD-10-CM

## 2020-02-28 DIAGNOSIS — M54.50 CHRONIC BILATERAL LOW BACK PAIN WITHOUT SCIATICA: ICD-10-CM

## 2020-02-28 DIAGNOSIS — M25.512 CHRONIC LEFT SHOULDER PAIN: Primary | ICD-10-CM

## 2020-02-28 DIAGNOSIS — G89.29 CHRONIC MIDLINE LOW BACK PAIN, UNSPECIFIED WHETHER SCIATICA PRESENT: ICD-10-CM

## 2020-02-28 DIAGNOSIS — M25.511 CHRONIC RIGHT SHOULDER PAIN: ICD-10-CM

## 2020-02-28 DIAGNOSIS — Z79.899 OTHER LONG TERM (CURRENT) DRUG THERAPY: ICD-10-CM

## 2020-02-28 DIAGNOSIS — G89.29 CHRONIC RIGHT SHOULDER PAIN: ICD-10-CM

## 2020-02-28 PROCEDURE — 99213 OFFICE O/P EST LOW 20 MIN: CPT | Performed by: PHYSICAL MEDICINE & REHABILITATION

## 2020-02-28 PROCEDURE — G0463 HOSPITAL OUTPT CLINIC VISIT: HCPCS | Performed by: PHYSICAL MEDICINE & REHABILITATION

## 2020-02-28 RX ORDER — OXYCODONE AND ACETAMINOPHEN 10; 325 MG/1; MG/1
1 TABLET ORAL EVERY 6 HOURS PRN
Qty: 120 TABLET | Refills: 0 | Status: SHIPPED | OUTPATIENT
Start: 2020-02-28 | End: 2020-05-26 | Stop reason: SDUPTHER

## 2020-02-28 RX ORDER — OXYCODONE AND ACETAMINOPHEN 10; 325 MG/1; MG/1
1 TABLET ORAL EVERY 6 HOURS PRN
Qty: 120 TABLET | Refills: 0 | Status: SHIPPED | OUTPATIENT
Start: 2020-02-28 | End: 2020-02-28 | Stop reason: SDUPTHER

## 2020-02-28 NOTE — PROGRESS NOTES
Subjective   Christopher Nuñez is a 59 y.o. male.     mid to lower back tail bone pain, recent bypass 2/6-discharged 2/9. rt shoulder and back pain, coccydynia. Referred for LBP, also now R shoulder and neck pain after fall. 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 10/10 at worst, 9/10 at best, 8/10 today. No weakness or numbness, no b/b incontinence. Has tried PT and TENS without benefit, Oyxcodone caused nausea, Hydrocodone 10mg helps, failed lower doses and Tramadol, OTC NSAIDs and tylenol, OTC creams. CT C-spine shows multilevel DDD and DJD, stenosis, no acute injury. Started Norco 10/325mg BID prn with benefit, some days could use 3, could not fill compounded cream due to cost, started Pennsaid with excellent benefit, Pennsaid denied, ordered diclofenac solution instead. Was taking Norco 10/325mg 2-3x/day with good benefit overall until he tore mesh from an umbilical hernia repair, will likely have surgery soon, then QID for hernia pain and working 60 hours/week. Became tolerant of Norco, rotated to Percocet 7.5/325mg QID which is less effective, then 10/325mg QID. Normally effective, but chest still healing from CABG, went to ED, dz with broken wire, now all broken, repaired with sutures, broke, needs plate in Feb 2018. New L RTC injury, needs Ortho, MRI pending, pain too severe at night. Taking Percocet 10mg QID prn.       The following portions of the patient's history were reviewed and updated as appropriate: allergies, current medications, past family history, past medical history, past social history, past surgical history and problem list.    Review of Systems   Constitutional: Negative for chills, fatigue and fever.   HENT: Positive for hearing loss. Negative for trouble swallowing.    Eyes: Negative for visual disturbance.   Respiratory: Negative for shortness of breath.    Cardiovascular:  Negative for chest pain.   Gastrointestinal: Negative for abdominal pain, constipation, diarrhea, nausea and vomiting.   Genitourinary: Negative for urinary incontinence.   Musculoskeletal: Positive for arthralgias, back pain and neck pain. Negative for joint swelling and myalgias.   Neurological: Negative for dizziness, weakness, numbness and headache.       Objective   Physical Exam   Constitutional: He is oriented to person, place, and time. He appears well-developed and well-nourished.   HENT:   Head: Normocephalic and atraumatic.   Eyes: Pupils are equal, round, and reactive to light. EOM are normal.   Neck: Normal range of motion.   Cardiovascular: Normal rate, regular rhythm, normal heart sounds and intact distal pulses.   Pulmonary/Chest: Breath sounds normal.   Abdominal: Soft. Bowel sounds are normal. He exhibits no distension. There is no tenderness.   Musculoskeletal:   Wearing b/l CTS braces   Neurological: He is alert and oriented to person, place, and time. He has normal strength and normal reflexes. He displays normal reflexes. No sensory deficit.   Psychiatric: He has a normal mood and affect. His behavior is normal. Thought content normal.         Assessment/Plan   Christopher was seen today for back pain and shoulder pain.    Diagnoses and all orders for this visit:    Chronic left shoulder pain    Chronic midline low back pain, unspecified whether sciatica present    Degeneration of intervertebral disc of lumbar region    Rotator cuff disorder, left    Chest wall pain    Chronic bilateral low back pain without sciatica    Neck pain    Pain of cervical facet joint    Chronic right shoulder pain    Degeneration of intervertebral disc of cervical region    Rotator cuff syndrome, left    Other long term (current) drug therapy    Spinal stenosis of cervical region        Inspect reviewed, in order. Low risk. Repeat UDS 6/6/19.  Cont Percocet 10/325mg #120. Given his highly varible pain level tied to  activity and injury, plan to keep on short-acting opioids to allow him to minimize opioid use as tolerated. Added Oxycodone ER 20mg qHS, doing better, will use sparingly in future.  Cont Tizanidine 4-8mg qHS prn muscle pain, insomnia, helping him sleep very well.  Cont other meds as prescribed.  Referral to Ortho for L RTC tear.  Had L subacromial injection.  Cont b/l CTS braces.  RTC 3 months for f/u.

## 2020-05-26 ENCOUNTER — OFFICE VISIT (OUTPATIENT)
Dept: PAIN MEDICINE | Facility: CLINIC | Age: 60
End: 2020-05-26

## 2020-05-26 VITALS
SYSTOLIC BLOOD PRESSURE: 135 MMHG | BODY MASS INDEX: 29.93 KG/M2 | RESPIRATION RATE: 16 BRPM | DIASTOLIC BLOOD PRESSURE: 86 MMHG | HEIGHT: 72 IN | TEMPERATURE: 98 F | WEIGHT: 221 LBS | OXYGEN SATURATION: 98 % | HEART RATE: 72 BPM

## 2020-05-26 DIAGNOSIS — M48.02 SPINAL STENOSIS OF CERVICAL REGION: ICD-10-CM

## 2020-05-26 DIAGNOSIS — M75.102 ROTATOR CUFF SYNDROME, LEFT: ICD-10-CM

## 2020-05-26 DIAGNOSIS — Z79.899 OTHER LONG TERM (CURRENT) DRUG THERAPY: ICD-10-CM

## 2020-05-26 DIAGNOSIS — M50.30 DEGENERATION OF INTERVERTEBRAL DISC OF CERVICAL REGION: ICD-10-CM

## 2020-05-26 DIAGNOSIS — M54.50 CHRONIC BILATERAL LOW BACK PAIN WITHOUT SCIATICA: ICD-10-CM

## 2020-05-26 DIAGNOSIS — G89.29 CHRONIC BILATERAL LOW BACK PAIN WITHOUT SCIATICA: ICD-10-CM

## 2020-05-26 DIAGNOSIS — G89.29 CHRONIC MIDLINE LOW BACK PAIN, UNSPECIFIED WHETHER SCIATICA PRESENT: ICD-10-CM

## 2020-05-26 DIAGNOSIS — M51.36 DEGENERATION OF INTERVERTEBRAL DISC OF LUMBAR REGION: ICD-10-CM

## 2020-05-26 DIAGNOSIS — M25.512 CHRONIC LEFT SHOULDER PAIN: ICD-10-CM

## 2020-05-26 DIAGNOSIS — G89.29 CHRONIC RIGHT SHOULDER PAIN: ICD-10-CM

## 2020-05-26 DIAGNOSIS — M25.511 CHRONIC RIGHT SHOULDER PAIN: ICD-10-CM

## 2020-05-26 DIAGNOSIS — M67.912 ROTATOR CUFF DISORDER, LEFT: ICD-10-CM

## 2020-05-26 DIAGNOSIS — M54.2 PAIN OF CERVICAL FACET JOINT: ICD-10-CM

## 2020-05-26 DIAGNOSIS — M54.2 NECK PAIN: ICD-10-CM

## 2020-05-26 DIAGNOSIS — R07.89 CHEST WALL PAIN: Primary | ICD-10-CM

## 2020-05-26 DIAGNOSIS — G89.29 CHRONIC LEFT SHOULDER PAIN: ICD-10-CM

## 2020-05-26 DIAGNOSIS — M54.50 CHRONIC MIDLINE LOW BACK PAIN, UNSPECIFIED WHETHER SCIATICA PRESENT: ICD-10-CM

## 2020-05-26 PROCEDURE — G0463 HOSPITAL OUTPT CLINIC VISIT: HCPCS | Performed by: PHYSICAL MEDICINE & REHABILITATION

## 2020-05-26 PROCEDURE — 99213 OFFICE O/P EST LOW 20 MIN: CPT | Performed by: PHYSICAL MEDICINE & REHABILITATION

## 2020-05-26 RX ORDER — ZALEPLON 10 MG/1
CAPSULE ORAL
COMMUNITY
Start: 2020-04-15 | End: 2021-08-17 | Stop reason: ALTCHOICE

## 2020-05-26 RX ORDER — CLOPIDOGREL BISULFATE 75 MG/1
TABLET ORAL
COMMUNITY
Start: 2020-03-10

## 2020-05-26 RX ORDER — OXYCODONE AND ACETAMINOPHEN 10; 325 MG/1; MG/1
1 TABLET ORAL EVERY 6 HOURS PRN
Qty: 120 TABLET | Refills: 0 | Status: SHIPPED | OUTPATIENT
Start: 2020-05-26 | End: 2020-08-27 | Stop reason: SDUPTHER

## 2020-05-26 RX ORDER — OXYCODONE AND ACETAMINOPHEN 10; 325 MG/1; MG/1
1 TABLET ORAL EVERY 6 HOURS PRN
Qty: 120 TABLET | Refills: 0 | Status: SHIPPED | OUTPATIENT
Start: 2020-05-26 | End: 2020-05-26 | Stop reason: SDUPTHER

## 2020-05-26 RX ORDER — FENOFIBRATE 160 MG/1
160 TABLET ORAL DAILY
COMMUNITY
Start: 2020-03-10 | End: 2021-03-10

## 2020-05-26 RX ORDER — ZALEPLON 10 MG/1
10 CAPSULE ORAL DAILY
COMMUNITY
Start: 2020-04-15 | End: 2021-08-17 | Stop reason: ALTCHOICE

## 2020-05-26 NOTE — PROGRESS NOTES
Subjective   Christopher Nuñez is a 59 y.o. male.     mid to lower back tail bone pain, recent bypass 2/6-discharged 2/9. rt shoulder and back pain, coccydynia. Referred for LBP, also now R shoulder and neck pain after fall. 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 10/10 at worst, 9/10 at best, 8/10 today. No weakness or numbness, no b/b incontinence. Has tried PT and TENS without benefit, Oyxcodone caused nausea, Hydrocodone 10mg helps, failed lower doses and Tramadol, OTC NSAIDs and tylenol, OTC creams. CT C-spine shows multilevel DDD and DJD, stenosis, no acute injury. Started Norco 10/325mg BID prn with benefit, some days could use 3, could not fill compounded cream due to cost, started Pennsaid with excellent benefit, Pennsaid denied, ordered diclofenac solution instead. Was taking Norco 10/325mg 2-3x/day with good benefit overall until he tore mesh from an umbilical hernia repair, will likely have surgery soon, then QID for hernia pain and working 60 hours/week. Became tolerant of Norco, rotated to Percocet 7.5/325mg QID which is less effective, then 10/325mg QID. Normally effective, but chest still healing from CABG, went to ED, dz with broken wire, now all broken, repaired with sutures, broke, needs plate in Feb 2018. New L RTC injury, needs Ortho, MRI pending, pain too severe at night. Taking Percocet 10mg QID prn.       The following portions of the patient's history were reviewed and updated as appropriate: allergies, current medications, past family history, past medical history, past social history, past surgical history and problem list.    Review of Systems   Constitutional: Negative for chills, fatigue and fever.   HENT: Positive for hearing loss. Negative for trouble swallowing.    Eyes: Negative for visual disturbance.   Respiratory: Negative for shortness of breath.    Cardiovascular:  Negative for chest pain.   Gastrointestinal: Negative for abdominal pain, constipation, diarrhea, nausea and vomiting.   Genitourinary: Negative for urinary incontinence.   Musculoskeletal: Positive for arthralgias, back pain and neck pain. Negative for joint swelling and myalgias.   Neurological: Negative for dizziness, weakness, numbness and headache.       Objective   Physical Exam   Constitutional: He is oriented to person, place, and time. He appears well-developed and well-nourished.   HENT:   Head: Normocephalic and atraumatic.   Eyes: Pupils are equal, round, and reactive to light. EOM are normal.   Neck: Normal range of motion.   Cardiovascular: Normal rate, regular rhythm, normal heart sounds and intact distal pulses.   Pulmonary/Chest: Breath sounds normal.   Abdominal: Soft. Bowel sounds are normal. He exhibits no distension. There is no tenderness.   Musculoskeletal:   Wearing b/l CTS braces   Neurological: He is alert and oriented to person, place, and time. He has normal strength and normal reflexes. He displays normal reflexes. No sensory deficit.   Psychiatric: He has a normal mood and affect. His behavior is normal. Thought content normal.         Assessment/Plan   Christopher was seen today for back pain and pain.    Diagnoses and all orders for this visit:    Chest wall pain    Chronic left shoulder pain    Chronic bilateral low back pain without sciatica    Neck pain    Pain of cervical facet joint    Chronic right shoulder pain    Degeneration of intervertebral disc of cervical region    Degeneration of intervertebral disc of lumbar region    Rotator cuff disorder, left    Rotator cuff syndrome, left    Other long term (current) drug therapy    Spinal stenosis of cervical region        Inspect reviewed, in order. Low risk. Repeat UDS 6/6/19.  Cont Percocet 10/325mg #120. Given his highly varible pain level tied to activity and injury, plan to keep on short-acting opioids to allow him to minimize  opioid use as tolerated. Added Oxycodone ER 20mg qHS, doing better, will use sparingly in future.  Cont Tizanidine 4-8mg qHS prn muscle pain, insomnia, helping him sleep very well.  Cont other meds as prescribed.  Referral to Ortho for L RTC tear.  Had L subacromial injection.  Cont b/l CTS braces.  RTC 3 months for f/u.

## 2020-08-27 ENCOUNTER — RESULTS ENCOUNTER (OUTPATIENT)
Dept: PAIN MEDICINE | Facility: CLINIC | Age: 60
End: 2020-08-27

## 2020-08-27 ENCOUNTER — OFFICE VISIT (OUTPATIENT)
Dept: PAIN MEDICINE | Facility: CLINIC | Age: 60
End: 2020-08-27

## 2020-08-27 VITALS
OXYGEN SATURATION: 96 % | HEART RATE: 78 BPM | TEMPERATURE: 97.1 F | RESPIRATION RATE: 16 BRPM | HEIGHT: 74 IN | WEIGHT: 231 LBS | SYSTOLIC BLOOD PRESSURE: 126 MMHG | DIASTOLIC BLOOD PRESSURE: 80 MMHG | BODY MASS INDEX: 29.65 KG/M2

## 2020-08-27 DIAGNOSIS — F19.90 CURRENT DRUG USE: Primary | ICD-10-CM

## 2020-08-27 DIAGNOSIS — M75.102 ROTATOR CUFF SYNDROME, LEFT: ICD-10-CM

## 2020-08-27 DIAGNOSIS — Z79.899 OTHER LONG TERM (CURRENT) DRUG THERAPY: ICD-10-CM

## 2020-08-27 DIAGNOSIS — M54.2 NECK PAIN: ICD-10-CM

## 2020-08-27 DIAGNOSIS — M25.511 CHRONIC RIGHT SHOULDER PAIN: ICD-10-CM

## 2020-08-27 DIAGNOSIS — G89.29 CHRONIC BILATERAL LOW BACK PAIN WITHOUT SCIATICA: ICD-10-CM

## 2020-08-27 DIAGNOSIS — M67.912 ROTATOR CUFF DISORDER, LEFT: ICD-10-CM

## 2020-08-27 DIAGNOSIS — M25.512 CHRONIC LEFT SHOULDER PAIN: ICD-10-CM

## 2020-08-27 DIAGNOSIS — M54.50 CHRONIC MIDLINE LOW BACK PAIN, UNSPECIFIED WHETHER SCIATICA PRESENT: ICD-10-CM

## 2020-08-27 DIAGNOSIS — M50.30 DEGENERATION OF INTERVERTEBRAL DISC OF CERVICAL REGION: ICD-10-CM

## 2020-08-27 DIAGNOSIS — M51.36 DEGENERATION OF INTERVERTEBRAL DISC OF LUMBAR REGION: ICD-10-CM

## 2020-08-27 DIAGNOSIS — G89.29 CHRONIC MIDLINE LOW BACK PAIN, UNSPECIFIED WHETHER SCIATICA PRESENT: ICD-10-CM

## 2020-08-27 DIAGNOSIS — G89.29 CHRONIC RIGHT SHOULDER PAIN: ICD-10-CM

## 2020-08-27 DIAGNOSIS — G89.29 CHRONIC LEFT SHOULDER PAIN: ICD-10-CM

## 2020-08-27 DIAGNOSIS — F19.90 CURRENT DRUG USE: ICD-10-CM

## 2020-08-27 DIAGNOSIS — M48.02 SPINAL STENOSIS OF CERVICAL REGION: ICD-10-CM

## 2020-08-27 DIAGNOSIS — M54.50 CHRONIC BILATERAL LOW BACK PAIN WITHOUT SCIATICA: ICD-10-CM

## 2020-08-27 DIAGNOSIS — M54.2 PAIN OF CERVICAL FACET JOINT: ICD-10-CM

## 2020-08-27 PROCEDURE — G0463 HOSPITAL OUTPT CLINIC VISIT: HCPCS | Performed by: PHYSICAL MEDICINE & REHABILITATION

## 2020-08-27 PROCEDURE — 99213 OFFICE O/P EST LOW 20 MIN: CPT | Performed by: PHYSICAL MEDICINE & REHABILITATION

## 2020-08-27 RX ORDER — TIZANIDINE 4 MG/1
4 TABLET ORAL NIGHTLY PRN
Qty: 60 TABLET | Refills: 2 | Status: SHIPPED | OUTPATIENT
Start: 2020-08-27 | End: 2021-05-11 | Stop reason: SDUPTHER

## 2020-08-27 RX ORDER — OXYCODONE AND ACETAMINOPHEN 10; 325 MG/1; MG/1
1 TABLET ORAL EVERY 6 HOURS PRN
Qty: 120 TABLET | Refills: 0 | Status: SHIPPED | OUTPATIENT
Start: 2020-08-27 | End: 2020-08-27 | Stop reason: SDUPTHER

## 2020-08-27 RX ORDER — OXYCODONE AND ACETAMINOPHEN 10; 325 MG/1; MG/1
1 TABLET ORAL EVERY 6 HOURS PRN
Qty: 120 TABLET | Refills: 0 | Status: SHIPPED | OUTPATIENT
Start: 2020-08-27 | End: 2020-11-17 | Stop reason: SDUPTHER

## 2020-08-27 NOTE — PROGRESS NOTES
Subjective   Christopher Nuñez is a 59 y.o. male.     mid to lower back tail bone pain, recent bypass 2/6-discharged 2/9. rt shoulder and back pain, coccydynia. Referred for LBP, also now R shoulder and neck pain after fall. 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 10/10 at worst, 9/10 at best, 8/10 today. No weakness or numbness, no b/b incontinence. Has tried PT and TENS without benefit, Oyxcodone caused nausea, Hydrocodone 10mg helps, failed lower doses and Tramadol, OTC NSAIDs and tylenol, OTC creams. CT C-spine shows multilevel DDD and DJD, stenosis, no acute injury. Started Norco 10/325mg BID prn with benefit, some days could use 3, could not fill compounded cream due to cost, started Pennsaid with excellent benefit, Pennsaid denied, ordered diclofenac solution instead. Was taking Norco 10/325mg 2-3x/day with good benefit overall until he tore mesh from an umbilical hernia repair, will likely have surgery soon, then QID for hernia pain and working 60 hours/week. Became tolerant of Norco, rotated to Percocet 7.5/325mg QID which is less effective, then 10/325mg QID. Normally effective, but chest still healing from CABG, went to ED, dz with broken wire, now all broken, repaired with sutures, broke, needs plate in Feb 2018. New L RTC injury, needs Ortho, MRI pending, pain too severe at night. Taking Percocet 10mg QID prn. Hard to sleep with neck pain.       The following portions of the patient's history were reviewed and updated as appropriate: allergies, current medications, past family history, past medical history, past social history, past surgical history and problem list.    Review of Systems   Constitutional: Negative for chills, fatigue and fever.   HENT: Positive for hearing loss. Negative for trouble swallowing.    Eyes: Negative for visual disturbance.   Respiratory: Negative for shortness of  breath.    Cardiovascular: Negative for chest pain.   Gastrointestinal: Negative for abdominal pain, constipation, diarrhea, nausea and vomiting.   Genitourinary: Negative for urinary incontinence.   Musculoskeletal: Positive for arthralgias, back pain and neck pain. Negative for joint swelling and myalgias.   Neurological: Negative for dizziness, weakness, numbness and headache.       Objective   Physical Exam   Constitutional: He is oriented to person, place, and time. He appears well-developed and well-nourished.   HENT:   Head: Normocephalic and atraumatic.   Eyes: Pupils are equal, round, and reactive to light. EOM are normal.   Neck: Normal range of motion.   Cardiovascular: Normal rate, regular rhythm, normal heart sounds and intact distal pulses.   Pulmonary/Chest: Breath sounds normal.   Abdominal: Soft. Bowel sounds are normal. He exhibits no distension. There is no tenderness.   Musculoskeletal:   Wearing b/l CTS braces   Neurological: He is alert and oriented to person, place, and time. He has normal strength and normal reflexes. He displays normal reflexes. No sensory deficit.   Psychiatric: He has a normal mood and affect. His behavior is normal. Thought content normal.         Assessment/Plan   Christopher was seen today for back pain.    Diagnoses and all orders for this visit:    Chronic bilateral low back pain without sciatica    Neck pain    Pain of cervical facet joint    Chronic left shoulder pain    Chronic right shoulder pain    Degeneration of intervertebral disc of cervical region    Degeneration of intervertebral disc of lumbar region    Rotator cuff disorder, left    Rotator cuff syndrome, left    Spinal stenosis of cervical region    Other long term (current) drug therapy        Inspect reviewed, in order. Low risk. Repeat UDS 6/6/19.  Cont Percocet 10/325mg #120. Given his highly varible pain level tied to activity and injury, plan to keep on short-acting opioids to allow him to minimize  opioid use as tolerated. Added Oxycodone ER 20mg qHS, doing better, will use sparingly in future.  Restart Tizanidine 4-8mg qHS prn muscle pain, insomnia.  Cont other meds as prescribed.  Referral to Ortho for L RTC tear.  Had L subacromial injection.  Cont b/l CTS braces.  RTC 3 months for f/u.

## 2020-11-17 ENCOUNTER — OFFICE VISIT (OUTPATIENT)
Dept: PAIN MEDICINE | Facility: CLINIC | Age: 60
End: 2020-11-17

## 2020-11-17 VITALS
WEIGHT: 237 LBS | HEIGHT: 74 IN | DIASTOLIC BLOOD PRESSURE: 90 MMHG | TEMPERATURE: 97.7 F | HEART RATE: 76 BPM | RESPIRATION RATE: 16 BRPM | SYSTOLIC BLOOD PRESSURE: 148 MMHG | BODY MASS INDEX: 30.42 KG/M2 | OXYGEN SATURATION: 96 %

## 2020-11-17 DIAGNOSIS — M54.50 CHRONIC BILATERAL LOW BACK PAIN WITHOUT SCIATICA: Primary | ICD-10-CM

## 2020-11-17 DIAGNOSIS — G89.29 CHRONIC RIGHT SHOULDER PAIN: ICD-10-CM

## 2020-11-17 DIAGNOSIS — M54.2 NECK PAIN: ICD-10-CM

## 2020-11-17 DIAGNOSIS — Z79.899 OTHER LONG TERM (CURRENT) DRUG THERAPY: ICD-10-CM

## 2020-11-17 DIAGNOSIS — M54.50 CHRONIC MIDLINE LOW BACK PAIN, UNSPECIFIED WHETHER SCIATICA PRESENT: ICD-10-CM

## 2020-11-17 DIAGNOSIS — M48.02 SPINAL STENOSIS OF CERVICAL REGION: ICD-10-CM

## 2020-11-17 DIAGNOSIS — M25.512 CHRONIC LEFT SHOULDER PAIN: ICD-10-CM

## 2020-11-17 DIAGNOSIS — G89.29 CHRONIC LEFT SHOULDER PAIN: ICD-10-CM

## 2020-11-17 DIAGNOSIS — G89.29 CHRONIC MIDLINE LOW BACK PAIN, UNSPECIFIED WHETHER SCIATICA PRESENT: ICD-10-CM

## 2020-11-17 DIAGNOSIS — M51.36 DEGENERATION OF INTERVERTEBRAL DISC OF LUMBAR REGION: ICD-10-CM

## 2020-11-17 DIAGNOSIS — G89.29 CHRONIC BILATERAL LOW BACK PAIN WITHOUT SCIATICA: Primary | ICD-10-CM

## 2020-11-17 DIAGNOSIS — M54.2 PAIN OF CERVICAL FACET JOINT: ICD-10-CM

## 2020-11-17 DIAGNOSIS — M25.511 CHRONIC RIGHT SHOULDER PAIN: ICD-10-CM

## 2020-11-17 DIAGNOSIS — R07.89 CHEST WALL PAIN: ICD-10-CM

## 2020-11-17 DIAGNOSIS — M67.912 ROTATOR CUFF DISORDER, LEFT: ICD-10-CM

## 2020-11-17 DIAGNOSIS — M50.30 DEGENERATION OF INTERVERTEBRAL DISC OF CERVICAL REGION: ICD-10-CM

## 2020-11-17 PROCEDURE — 99213 OFFICE O/P EST LOW 20 MIN: CPT | Performed by: PHYSICAL MEDICINE & REHABILITATION

## 2020-11-17 PROCEDURE — G0463 HOSPITAL OUTPT CLINIC VISIT: HCPCS | Performed by: PHYSICAL MEDICINE & REHABILITATION

## 2020-11-17 RX ORDER — OXYCODONE AND ACETAMINOPHEN 10; 325 MG/1; MG/1
1 TABLET ORAL EVERY 6 HOURS PRN
Qty: 120 TABLET | Refills: 0 | Status: SHIPPED | OUTPATIENT
Start: 2020-11-17 | End: 2021-02-16 | Stop reason: SDUPTHER

## 2020-11-17 RX ORDER — OXYCODONE AND ACETAMINOPHEN 10; 325 MG/1; MG/1
1 TABLET ORAL EVERY 6 HOURS PRN
Qty: 120 TABLET | Refills: 0 | Status: SHIPPED | OUTPATIENT
Start: 2020-11-17 | End: 2020-11-17 | Stop reason: SDUPTHER

## 2020-11-17 NOTE — PROGRESS NOTES
Subjective   Christopher Nuñez is a 59 y.o. male.     mid to lower back tail bone pain, recent bypass 2/6-discharged 2/9. rt shoulder and back pain, coccydynia. Referred for LBP, also now R shoulder and neck pain after fall. 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 10/10 at worst, 9/10 at best, 8/10 today. No weakness or numbness, no b/b incontinence. Has tried PT and TENS without benefit, Oyxcodone caused nausea, Hydrocodone 10mg helps, failed lower doses and Tramadol, OTC NSAIDs and tylenol, OTC creams. CT C-spine shows multilevel DDD and DJD, stenosis, no acute injury. Started Norco 10/325mg BID prn with benefit, some days could use 3, could not fill compounded cream due to cost, started Pennsaid with excellent benefit, Pennsaid denied, ordered diclofenac solution instead. Was taking Norco 10/325mg 2-3x/day with good benefit overall until he tore mesh from an umbilical hernia repair, will likely have surgery soon, then QID for hernia pain and working 60 hours/week. Became tolerant of Norco, rotated to Percocet 7.5/325mg QID which is less effective, then 10/325mg QID. Normally effective, but chest still healing from CABG, went to ED, dz with broken wire, now all broken, repaired with sutures, broke, needs plate in Feb 2018. New L RTC injury, needs Ortho, MRI pending, pain too severe at night. Taking Percocet 10mg QID prn. Hard to sleep with neck pain.       The following portions of the patient's history were reviewed and updated as appropriate: allergies, current medications, past family history, past medical history, past social history, past surgical history and problem list.    Review of Systems   Constitutional: Negative for chills, fatigue and fever.   HENT: Positive for hearing loss. Negative for trouble swallowing.    Eyes: Negative for visual disturbance.   Respiratory: Negative for shortness of  breath.    Cardiovascular: Negative for chest pain.   Gastrointestinal: Negative for abdominal pain, constipation, diarrhea, nausea and vomiting.   Genitourinary: Negative for urinary incontinence.   Musculoskeletal: Positive for arthralgias, back pain and neck pain. Negative for joint swelling and myalgias.   Neurological: Negative for dizziness, weakness, numbness and headache.       Objective   Physical Exam   Constitutional: He is oriented to person, place, and time. He appears well-developed and well-nourished.   HENT:   Head: Normocephalic and atraumatic.   Eyes: Pupils are equal, round, and reactive to light.   Neck: Normal range of motion.   Cardiovascular: Normal rate, regular rhythm and normal heart sounds.   Pulmonary/Chest: Breath sounds normal.   Abdominal: Soft. Bowel sounds are normal. He exhibits no distension. There is no abdominal tenderness.   Musculoskeletal:      Comments: Wearing b/l CTS braces   Neurological: He is alert and oriented to person, place, and time. He has normal reflexes. He displays normal reflexes. No sensory deficit.   Psychiatric: His behavior is normal. Thought content normal.         Assessment/Plan   Diagnoses and all orders for this visit:    1. Chronic bilateral low back pain without sciatica (Primary)    2. Neck pain    3. Pain of cervical facet joint    4. Chronic left shoulder pain    5. Chronic right shoulder pain    6. Degeneration of intervertebral disc of cervical region    7. Degeneration of intervertebral disc of lumbar region    8. Rotator cuff disorder, left    9. Chest wall pain    10. Other long term (current) drug therapy    11. Spinal stenosis of cervical region        Inspect reviewed, in order. Low risk. Repeat UDS 8/27/20.  Cont Percocet 10/325mg #120. Given his highly varible pain level tied to activity and injury, plan to keep on short-acting opioids to allow him to minimize opioid use as tolerated. Added Oxycodone ER 20mg qHS, doing better, will use  sparingly in future.  Restarted Tizanidine 4-8mg qHS prn muscle pain, insomnia.  Cont other meds as prescribed.  Referral to Ortho for L RTC tear.  Had L subacromial injection.  Cont b/l CTS braces.  RTC 3 months for f/u.

## 2021-02-16 ENCOUNTER — OFFICE VISIT (OUTPATIENT)
Dept: PAIN MEDICINE | Facility: CLINIC | Age: 61
End: 2021-02-16

## 2021-02-16 VITALS
OXYGEN SATURATION: 96 % | HEIGHT: 74 IN | WEIGHT: 237 LBS | RESPIRATION RATE: 16 BRPM | DIASTOLIC BLOOD PRESSURE: 84 MMHG | HEART RATE: 82 BPM | SYSTOLIC BLOOD PRESSURE: 129 MMHG | BODY MASS INDEX: 30.42 KG/M2 | TEMPERATURE: 98.2 F

## 2021-02-16 DIAGNOSIS — M50.30 DEGENERATION OF INTERVERTEBRAL DISC OF CERVICAL REGION: ICD-10-CM

## 2021-02-16 DIAGNOSIS — M67.912 ROTATOR CUFF DISORDER, LEFT: ICD-10-CM

## 2021-02-16 DIAGNOSIS — M54.2 NECK PAIN: ICD-10-CM

## 2021-02-16 DIAGNOSIS — M51.36 DEGENERATION OF INTERVERTEBRAL DISC OF LUMBAR REGION: ICD-10-CM

## 2021-02-16 DIAGNOSIS — G89.29 CHRONIC BILATERAL LOW BACK PAIN WITHOUT SCIATICA: Primary | ICD-10-CM

## 2021-02-16 DIAGNOSIS — G89.29 CHRONIC MIDLINE LOW BACK PAIN, UNSPECIFIED WHETHER SCIATICA PRESENT: ICD-10-CM

## 2021-02-16 DIAGNOSIS — M48.02 SPINAL STENOSIS OF CERVICAL REGION: ICD-10-CM

## 2021-02-16 DIAGNOSIS — M54.50 CHRONIC MIDLINE LOW BACK PAIN, UNSPECIFIED WHETHER SCIATICA PRESENT: ICD-10-CM

## 2021-02-16 DIAGNOSIS — G89.29 CHRONIC LEFT SHOULDER PAIN: ICD-10-CM

## 2021-02-16 DIAGNOSIS — M25.512 CHRONIC LEFT SHOULDER PAIN: ICD-10-CM

## 2021-02-16 DIAGNOSIS — Z79.899 OTHER LONG TERM (CURRENT) DRUG THERAPY: ICD-10-CM

## 2021-02-16 DIAGNOSIS — M54.50 CHRONIC BILATERAL LOW BACK PAIN WITHOUT SCIATICA: Primary | ICD-10-CM

## 2021-02-16 DIAGNOSIS — M25.511 CHRONIC RIGHT SHOULDER PAIN: ICD-10-CM

## 2021-02-16 DIAGNOSIS — G89.29 CHRONIC RIGHT SHOULDER PAIN: ICD-10-CM

## 2021-02-16 DIAGNOSIS — M54.2 PAIN OF CERVICAL FACET JOINT: ICD-10-CM

## 2021-02-16 DIAGNOSIS — R07.89 CHEST WALL PAIN: ICD-10-CM

## 2021-02-16 PROCEDURE — 99213 OFFICE O/P EST LOW 20 MIN: CPT | Performed by: PHYSICAL MEDICINE & REHABILITATION

## 2021-02-16 RX ORDER — OXYCODONE AND ACETAMINOPHEN 10; 325 MG/1; MG/1
1 TABLET ORAL EVERY 6 HOURS PRN
Qty: 120 TABLET | Refills: 0 | Status: SHIPPED | OUTPATIENT
Start: 2021-02-16 | End: 2021-05-11 | Stop reason: SDUPTHER

## 2021-02-16 RX ORDER — OXYCODONE HCL 20 MG/1
20 TABLET, FILM COATED, EXTENDED RELEASE ORAL NIGHTLY PRN
Qty: 30 TABLET | Refills: 0 | Status: SHIPPED | OUTPATIENT
Start: 2021-02-16 | End: 2021-05-11 | Stop reason: SDUPTHER

## 2021-02-16 RX ORDER — OXYCODONE AND ACETAMINOPHEN 10; 325 MG/1; MG/1
1 TABLET ORAL EVERY 6 HOURS PRN
Qty: 120 TABLET | Refills: 0 | Status: SHIPPED | OUTPATIENT
Start: 2021-02-16 | End: 2021-04-08 | Stop reason: SDUPTHER

## 2021-02-16 NOTE — PROGRESS NOTES
Subjective   Christopher Nuñez is a 60 y.o. male.     mid to lower back tail bone pain, recent bypass 2/6-discharged 2/9. rt shoulder and back pain, coccydynia. Referred for LBP, also now R shoulder and neck pain after fall. 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 10/10 at worst, 9/10 at best, 8/10 today. No weakness or numbness, no b/b incontinence. Has tried PT and TENS without benefit, Oyxcodone caused nausea, Hydrocodone 10mg helps, failed lower doses and Tramadol, OTC NSAIDs and tylenol, OTC creams. CT C-spine shows multilevel DDD and DJD, stenosis, no acute injury. Started Norco 10/325mg BID prn with benefit, some days could use 3, could not fill compounded cream due to cost, started Pennsaid with excellent benefit, Pennsaid denied, ordered diclofenac solution instead. Was taking Norco 10/325mg 2-3x/day with good benefit overall until he tore mesh from an umbilical hernia repair, will likely have surgery soon, then QID for hernia pain and working 60 hours/week. Became tolerant of Norco, rotated to Percocet 7.5/325mg QID which is less effective, then 10/325mg QID. Normally effective, but chest still healing from CABG, went to ED, dz with broken wire, now all broken, repaired with sutures, broke, needs plate in Feb 2018. New L RTC injury, needs Ortho, MRI pending, pain too severe at night. Taking Percocet 10mg QID prn. Hard to sleep with neck pain.       The following portions of the patient's history were reviewed and updated as appropriate: allergies, current medications, past family history, past medical history, past social history, past surgical history and problem list.    Review of Systems   Constitutional: Negative for chills, fatigue and fever.   HENT: Positive for hearing loss. Negative for trouble swallowing.    Eyes: Negative for visual disturbance.   Respiratory: Negative for shortness of  breath.    Cardiovascular: Negative for chest pain.   Gastrointestinal: Negative for abdominal pain, constipation, diarrhea, nausea and vomiting.   Genitourinary: Negative for urinary incontinence.   Musculoskeletal: Positive for arthralgias, back pain and neck pain. Negative for joint swelling and myalgias.   Neurological: Negative for dizziness, weakness, numbness and headache.       Objective   Physical Exam   Constitutional: He is oriented to person, place, and time. He appears well-developed and well-nourished.   HENT:   Head: Normocephalic and atraumatic.   Eyes: Pupils are equal, round, and reactive to light.   Neck: Normal range of motion.   Cardiovascular: Normal rate, regular rhythm and normal heart sounds.   Pulmonary/Chest: Breath sounds normal.   Abdominal: Soft. Bowel sounds are normal. He exhibits no distension. There is no abdominal tenderness.   Musculoskeletal:      Comments: Wearing b/l CTS braces   Neurological: He is alert and oriented to person, place, and time. He has normal reflexes. He displays normal reflexes. No sensory deficit.   Psychiatric: His behavior is normal. Thought content normal.         Assessment/Plan   Diagnoses and all orders for this visit:    1. Chronic bilateral low back pain without sciatica (Primary)    2. Chronic left shoulder pain    3. Chest wall pain    4. Degeneration of intervertebral disc of cervical region    5. Degeneration of intervertebral disc of lumbar region    6. Neck pain    7. Other long term (current) drug therapy    8. Pain of cervical facet joint    9. Rotator cuff disorder, left    10. Chronic right shoulder pain    11. Spinal stenosis of cervical region        Inspect reviewed, in order. Low risk. Repeat UDS 8/27/20.  Cont Percocet 10/325mg #120, filled 2/10/21. Given his highly varible pain level tied to activity and injury, plan to keep on short-acting opioids to allow him to minimize opioid use as tolerated. Added Oxycodone ER 20mg qHS, filled  1/25/21, doing better, will use sparingly in future.  Restarted Tizanidine 4-8mg qHS prn muscle pain, insomnia.  Cont other meds as prescribed.  Referral to Ortho for L RTC tear.  Had L subacromial injection.  Cont b/l CTS braces.  RTC 3 months for f/u.

## 2021-04-08 DIAGNOSIS — G89.29 CHRONIC BILATERAL LOW BACK PAIN WITHOUT SCIATICA: ICD-10-CM

## 2021-04-08 DIAGNOSIS — M54.50 CHRONIC BILATERAL LOW BACK PAIN WITHOUT SCIATICA: ICD-10-CM

## 2021-04-08 RX ORDER — OXYCODONE AND ACETAMINOPHEN 10; 325 MG/1; MG/1
1 TABLET ORAL EVERY 6 HOURS PRN
Qty: 120 TABLET | Refills: 0 | Status: SHIPPED | OUTPATIENT
Start: 2021-04-08 | End: 2021-05-11 | Stop reason: SDUPTHER

## 2021-04-08 NOTE — TELEPHONE ENCOUNTER
Needs resent to [facility]. [facility] is closed on Saturday.

## 2021-05-11 ENCOUNTER — OFFICE VISIT (OUTPATIENT)
Dept: PAIN MEDICINE | Facility: CLINIC | Age: 61
End: 2021-05-11

## 2021-05-11 VITALS
DIASTOLIC BLOOD PRESSURE: 82 MMHG | HEART RATE: 80 BPM | OXYGEN SATURATION: 97 % | BODY MASS INDEX: 29 KG/M2 | HEIGHT: 74 IN | WEIGHT: 226 LBS | SYSTOLIC BLOOD PRESSURE: 125 MMHG | RESPIRATION RATE: 16 BRPM

## 2021-05-11 DIAGNOSIS — M51.36 DEGENERATION OF INTERVERTEBRAL DISC OF LUMBAR REGION: ICD-10-CM

## 2021-05-11 DIAGNOSIS — M54.50 CHRONIC MIDLINE LOW BACK PAIN, UNSPECIFIED WHETHER SCIATICA PRESENT: ICD-10-CM

## 2021-05-11 DIAGNOSIS — M54.50 CHRONIC BILATERAL LOW BACK PAIN WITHOUT SCIATICA: Primary | ICD-10-CM

## 2021-05-11 DIAGNOSIS — G89.29 CHRONIC RIGHT SHOULDER PAIN: ICD-10-CM

## 2021-05-11 DIAGNOSIS — R07.89 CHEST WALL PAIN: ICD-10-CM

## 2021-05-11 DIAGNOSIS — M67.912 ROTATOR CUFF DISORDER, LEFT: ICD-10-CM

## 2021-05-11 DIAGNOSIS — M54.2 PAIN OF CERVICAL FACET JOINT: ICD-10-CM

## 2021-05-11 DIAGNOSIS — M75.102 ROTATOR CUFF SYNDROME, LEFT: ICD-10-CM

## 2021-05-11 DIAGNOSIS — G89.29 CHRONIC BILATERAL LOW BACK PAIN WITHOUT SCIATICA: Primary | ICD-10-CM

## 2021-05-11 DIAGNOSIS — M54.2 NECK PAIN: ICD-10-CM

## 2021-05-11 DIAGNOSIS — G89.29 CHRONIC LEFT SHOULDER PAIN: ICD-10-CM

## 2021-05-11 DIAGNOSIS — Z79.899 OTHER LONG TERM (CURRENT) DRUG THERAPY: ICD-10-CM

## 2021-05-11 DIAGNOSIS — M50.30 DEGENERATION OF INTERVERTEBRAL DISC OF CERVICAL REGION: ICD-10-CM

## 2021-05-11 DIAGNOSIS — M25.512 CHRONIC LEFT SHOULDER PAIN: ICD-10-CM

## 2021-05-11 DIAGNOSIS — M25.511 CHRONIC RIGHT SHOULDER PAIN: ICD-10-CM

## 2021-05-11 DIAGNOSIS — G89.29 CHRONIC MIDLINE LOW BACK PAIN, UNSPECIFIED WHETHER SCIATICA PRESENT: ICD-10-CM

## 2021-05-11 DIAGNOSIS — M48.02 SPINAL STENOSIS OF CERVICAL REGION: ICD-10-CM

## 2021-05-11 PROCEDURE — 99213 OFFICE O/P EST LOW 20 MIN: CPT | Performed by: PHYSICAL MEDICINE & REHABILITATION

## 2021-05-11 RX ORDER — OXYCODONE AND ACETAMINOPHEN 10; 325 MG/1; MG/1
1 TABLET ORAL EVERY 6 HOURS PRN
Qty: 120 TABLET | Refills: 0 | Status: SHIPPED | OUTPATIENT
Start: 2021-05-11 | End: 2021-08-17 | Stop reason: SDUPTHER

## 2021-05-11 RX ORDER — OXYCODONE HCL 20 MG/1
20 TABLET, FILM COATED, EXTENDED RELEASE ORAL NIGHTLY PRN
Qty: 30 TABLET | Refills: 0 | Status: SHIPPED | OUTPATIENT
Start: 2021-05-11 | End: 2021-08-17 | Stop reason: SDUPTHER

## 2021-05-11 RX ORDER — TIZANIDINE 4 MG/1
4 TABLET ORAL NIGHTLY PRN
Qty: 60 TABLET | Refills: 11 | Status: SHIPPED | OUTPATIENT
Start: 2021-05-11 | End: 2022-02-08 | Stop reason: SDUPTHER

## 2021-05-11 NOTE — PROGRESS NOTES
Subjective   Christopher Nuñez is a 60 y.o. male.     mid to lower back tail bone pain, recent bypass 2/6-discharged 2/9. rt shoulder and back pain, coccydynia. Referred for LBP, also now R shoulder and neck pain after fall. 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 10/10 at worst, 9/10 at best, 8/10 today. No weakness or numbness, no b/b incontinence. Has tried PT and TENS without benefit, Oyxcodone caused nausea, Hydrocodone 10mg helps, failed lower doses and Tramadol, OTC NSAIDs and tylenol, OTC creams. CT C-spine shows multilevel DDD and DJD, stenosis, no acute injury. Started Norco 10/325mg BID prn with benefit, some days could use 3, could not fill compounded cream due to cost, started Pennsaid with excellent benefit, Pennsaid denied, ordered diclofenac solution instead. Was taking Norco 10/325mg 2-3x/day with good benefit overall until he tore mesh from an umbilical hernia repair, will likely have surgery soon, then QID for hernia pain and working 60 hours/week. Became tolerant of Norco, rotated to Percocet 7.5/325mg QID which is less effective, then 10/325mg QID. Normally effective, but chest still healing from CABG, went to ED, dz with broken wire, now all broken, repaired with sutures, broke, needs plate in Feb 2018. New L RTC injury, needs Ortho, MRI pending, pain too severe at night. Taking Percocet 10mg QID prn. Hard to sleep with neck pain.       The following portions of the patient's history were reviewed and updated as appropriate: allergies, current medications, past family history, past medical history, past social history, past surgical history and problem list.    Review of Systems   Constitutional: Negative for chills, fatigue and fever.   HENT: Positive for hearing loss. Negative for trouble swallowing.    Eyes: Negative for visual disturbance.   Respiratory: Negative for shortness of  breath.    Cardiovascular: Negative for chest pain.   Gastrointestinal: Negative for abdominal pain, constipation, diarrhea, nausea and vomiting.   Genitourinary: Negative for urinary incontinence.   Musculoskeletal: Positive for arthralgias, back pain and neck pain. Negative for joint swelling and myalgias.   Neurological: Negative for dizziness, weakness, numbness and headache.       Objective   Physical Exam   Constitutional: He is oriented to person, place, and time. He appears well-developed and well-nourished.   HENT:   Head: Normocephalic and atraumatic.   Eyes: Pupils are equal, round, and reactive to light.   Cardiovascular: Normal rate, regular rhythm and normal heart sounds.   Pulmonary/Chest: Breath sounds normal.   Abdominal: Soft. Bowel sounds are normal. He exhibits no distension. There is no abdominal tenderness.   Musculoskeletal:      Comments: Wearing b/l CTS braces   Neurological: He is alert and oriented to person, place, and time. He has normal reflexes. He displays normal reflexes. No sensory deficit.   Psychiatric: His behavior is normal. Thought content normal.         Assessment/Plan   Diagnoses and all orders for this visit:    1. Chronic bilateral low back pain without sciatica (Primary)    2. Neck pain    3. Chest wall pain    4. Degeneration of intervertebral disc of cervical region    5. Degeneration of intervertebral disc of lumbar region    6. Other long term (current) drug therapy    7. Pain of cervical facet joint    8. Rotator cuff disorder, left    9. Rotator cuff syndrome, left    10. Chronic left shoulder pain    11. Chronic right shoulder pain    12. Spinal stenosis of cervical region        Inspect reviewed, in order. Low risk. Repeat UDS 8/27/20.  Cont Percocet 10/325mg #120. Given his highly varible pain level tied to activity and injury, plan to keep on short-acting opioids to allow him to minimize opioid use as tolerated. Added Oxycodone ER 20mg qHS, filled 1/25/21, doing  better, will use sparingly in future.  Restarted Tizanidine 4-8mg qHS prn muscle pain, insomnia.  Cont other meds as prescribed.  Referral to Ortho for L RTC tear.  Had L subacromial injection.  Cont b/l CTS braces.  RTC 3 months for f/u.

## 2021-05-12 ENCOUNTER — TELEPHONE (OUTPATIENT)
Dept: PAIN MEDICINE | Facility: CLINIC | Age: 61
End: 2021-05-12

## 2021-05-12 NOTE — TELEPHONE ENCOUNTER
Spoke with Anai. All 3 prescriptions were sent to pharmacy yesterday with a confirmation received from pharmacy.

## 2021-05-12 NOTE — TELEPHONE ENCOUNTER
Caller: BRIGITTE ESPITIA   Relationship to Patient: WIFE    Phone Number: [phone]  Reason for Call: PATIENTS WIFE STATING THAT HER  HAS NOT GOTTEN HIS SCRIPT SENT TO THE PHARMACY EITHER, SHE STATES IF DR BENITES CAN WRITE A PAPER SCRIPT THAT THE PATIENT CAN COME PICK IT UP

## 2021-07-08 ENCOUNTER — TELEPHONE (OUTPATIENT)
Dept: PAIN MEDICINE | Facility: CLINIC | Age: 61
End: 2021-07-08

## 2021-07-08 NOTE — TELEPHONE ENCOUNTER
Caller:  BRIGITTE- SPOUSE  (NO RELEASE)    Reason for call:  CALLING TO CHECK STATUS OF PA FOR CONTROLLED MEDICATION    Rx:oxyCODONE-acetaminophen (Percocet)  MG per tablet  Sig - Route: Take 1 tablet by mouth Every 6 (Six) Hours As Needed for Moderate Pain . - Oral      Pharmacy:[facility],        PLEASE ADVISE,     Caller# [phone]    Additional note: PATIENT REFILL DUE 7/9/21- ONE DAYS WORTH LEFT.

## 2021-07-08 NOTE — TELEPHONE ENCOUNTER
Spoke with Anai about PA. Erich states there is an existing PA will not allow another to be done at this time.

## 2021-08-17 ENCOUNTER — OFFICE VISIT (OUTPATIENT)
Dept: PAIN MEDICINE | Facility: CLINIC | Age: 61
End: 2021-08-17

## 2021-08-17 VITALS
DIASTOLIC BLOOD PRESSURE: 96 MMHG | OXYGEN SATURATION: 98 % | HEART RATE: 80 BPM | RESPIRATION RATE: 16 BRPM | WEIGHT: 226 LBS | BODY MASS INDEX: 29 KG/M2 | SYSTOLIC BLOOD PRESSURE: 155 MMHG | HEIGHT: 74 IN

## 2021-08-17 DIAGNOSIS — M51.36 DEGENERATION OF INTERVERTEBRAL DISC OF LUMBAR REGION: ICD-10-CM

## 2021-08-17 DIAGNOSIS — G89.29 CHRONIC BILATERAL LOW BACK PAIN WITHOUT SCIATICA: Primary | ICD-10-CM

## 2021-08-17 DIAGNOSIS — Z79.899 HIGH RISK MEDICATION USE: Primary | ICD-10-CM

## 2021-08-17 DIAGNOSIS — G89.29 CHRONIC MIDLINE LOW BACK PAIN, UNSPECIFIED WHETHER SCIATICA PRESENT: ICD-10-CM

## 2021-08-17 DIAGNOSIS — M54.50 CHRONIC MIDLINE LOW BACK PAIN, UNSPECIFIED WHETHER SCIATICA PRESENT: ICD-10-CM

## 2021-08-17 DIAGNOSIS — M48.02 SPINAL STENOSIS OF CERVICAL REGION: ICD-10-CM

## 2021-08-17 DIAGNOSIS — M67.912 ROTATOR CUFF DISORDER, LEFT: ICD-10-CM

## 2021-08-17 DIAGNOSIS — Z79.899 OTHER LONG TERM (CURRENT) DRUG THERAPY: ICD-10-CM

## 2021-08-17 DIAGNOSIS — G89.29 CHRONIC RIGHT SHOULDER PAIN: ICD-10-CM

## 2021-08-17 DIAGNOSIS — M25.511 CHRONIC RIGHT SHOULDER PAIN: ICD-10-CM

## 2021-08-17 DIAGNOSIS — M54.50 CHRONIC BILATERAL LOW BACK PAIN WITHOUT SCIATICA: Primary | ICD-10-CM

## 2021-08-17 DIAGNOSIS — G89.29 CHRONIC LEFT SHOULDER PAIN: ICD-10-CM

## 2021-08-17 DIAGNOSIS — M54.2 PAIN OF CERVICAL FACET JOINT: ICD-10-CM

## 2021-08-17 DIAGNOSIS — M54.2 NECK PAIN: ICD-10-CM

## 2021-08-17 DIAGNOSIS — R07.89 CHEST WALL PAIN: ICD-10-CM

## 2021-08-17 DIAGNOSIS — M25.512 CHRONIC LEFT SHOULDER PAIN: ICD-10-CM

## 2021-08-17 DIAGNOSIS — M50.30 DEGENERATION OF INTERVERTEBRAL DISC OF CERVICAL REGION: ICD-10-CM

## 2021-08-17 PROCEDURE — 99213 OFFICE O/P EST LOW 20 MIN: CPT | Performed by: PHYSICAL MEDICINE & REHABILITATION

## 2021-08-17 RX ORDER — OXYCODONE HCL 20 MG/1
20 TABLET, FILM COATED, EXTENDED RELEASE ORAL NIGHTLY PRN
Qty: 30 TABLET | Refills: 0 | Status: SHIPPED | OUTPATIENT
Start: 2021-08-17 | End: 2022-08-02

## 2021-08-17 RX ORDER — OXYCODONE AND ACETAMINOPHEN 10; 325 MG/1; MG/1
1 TABLET ORAL EVERY 6 HOURS PRN
Qty: 120 TABLET | Refills: 0 | Status: SHIPPED | OUTPATIENT
Start: 2021-08-17 | End: 2021-11-02 | Stop reason: SDUPTHER

## 2021-08-17 RX ORDER — OXYCODONE AND ACETAMINOPHEN 10; 325 MG/1; MG/1
1 TABLET ORAL EVERY 6 HOURS PRN
Qty: 120 TABLET | Refills: 0 | Status: SHIPPED | OUTPATIENT
Start: 2021-08-17 | End: 2021-08-30 | Stop reason: SDUPTHER

## 2021-08-17 RX ORDER — OXYCODONE HCL 20 MG/1
20 TABLET, FILM COATED, EXTENDED RELEASE ORAL NIGHTLY PRN
Qty: 30 TABLET | Refills: 0 | Status: SHIPPED | OUTPATIENT
Start: 2021-08-17 | End: 2022-02-08

## 2021-08-17 RX ORDER — OXYCODONE HCL 20 MG/1
20 TABLET, FILM COATED, EXTENDED RELEASE ORAL NIGHTLY PRN
Qty: 30 TABLET | Refills: 0 | Status: SHIPPED | OUTPATIENT
Start: 2021-08-17 | End: 2021-08-23 | Stop reason: SDUPTHER

## 2021-08-17 NOTE — PROGRESS NOTES
Subjective   Christopher Nuñez is a 60 y.o. male.     mid to lower back tail bone pain, recent bypass 2/6-discharged 2/9. rt shoulder and back pain, coccydynia. Referred for LBP, also now R shoulder and neck pain after fall. 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 10/10 at worst, 9/10 at best, 8/10 today. No weakness or numbness, no b/b incontinence. Has tried PT and TENS without benefit, Oyxcodone caused nausea, Hydrocodone 10mg helps, failed lower doses and Tramadol, OTC NSAIDs and tylenol, OTC creams. CT C-spine shows multilevel DDD and DJD, stenosis, no acute injury. Started Norco 10/325mg BID prn with benefit, some days could use 3, could not fill compounded cream due to cost, started Pennsaid with excellent benefit, Pennsaid denied, ordered diclofenac solution instead. Was taking Norco 10/325mg 2-3x/day with good benefit overall until he tore mesh from an umbilical hernia repair, will likely have surgery soon, then QID for hernia pain and working 60 hours/week. Became tolerant of Norco, rotated to Percocet 7.5/325mg QID which is less effective, then 10/325mg QID. Normally effective, but chest still healing from CABG, went to ED, dz with broken wire, now all broken, repaired with sutures, broke, needs plate in Feb 2018. New L RTC injury, needs Ortho, MRI pending, pain too severe at night. Taking Percocet 10mg QID prn. Hard to sleep with neck pain.       The following portions of the patient's history were reviewed and updated as appropriate: allergies, current medications, past family history, past medical history, past social history, past surgical history and problem list.    Review of Systems   Constitutional: Negative for chills, fatigue and fever.   HENT: Positive for hearing loss. Negative for trouble swallowing.    Eyes: Negative for visual disturbance.   Respiratory: Negative for shortness of  breath.    Cardiovascular: Negative for chest pain.   Gastrointestinal: Negative for abdominal pain, constipation, diarrhea, nausea and vomiting.   Genitourinary: Negative for urinary incontinence.   Musculoskeletal: Positive for arthralgias, back pain and neck pain. Negative for joint swelling and myalgias.   Neurological: Negative for dizziness, weakness, numbness and headache.       Objective   Physical Exam   Constitutional: He is oriented to person, place, and time. He appears well-developed and well-nourished.   HENT:   Head: Normocephalic and atraumatic.   Eyes: Pupils are equal, round, and reactive to light.   Cardiovascular: Normal rate, regular rhythm and normal heart sounds.   Pulmonary/Chest: Breath sounds normal.   Abdominal: Soft. Bowel sounds are normal. He exhibits no distension. There is no abdominal tenderness.   Musculoskeletal:      Comments: Wearing b/l CTS braces   Neurological: He is alert and oriented to person, place, and time. He has normal reflexes. He displays normal reflexes. No sensory deficit.   Psychiatric: His behavior is normal. Thought content normal.         Assessment/Plan   Diagnoses and all orders for this visit:    1. Chronic bilateral low back pain without sciatica (Primary)    2. Chronic left shoulder pain    3. Chest wall pain    4. Degeneration of intervertebral disc of cervical region    5. Degeneration of intervertebral disc of lumbar region    6. Neck pain    7. Other long term (current) drug therapy    8. Pain of cervical facet joint    9. Rotator cuff disorder, left    10. Chronic right shoulder pain    11. Spinal stenosis of cervical region        Inspect reviewed, in order. Low risk. Repeat UDS 8/27/20.  Cont Percocet 10/325mg #120, filled 8/6/21. Given his highly varible pain level tied to activity and injury, plan to keep on short-acting opioids to allow him to minimize opioid use as tolerated. Added Oxycodone ER 20mg qHS, filled 7/23/21, doing better, will use  sparingly in future.  Restarted Tizanidine 4-8mg qHS prn muscle pain, insomnia.  Cont other meds as prescribed.  Referral to Ortho for L RTC tear.  Had L subacromial injection.  Cont b/l CTS braces.  RTC 3 months for f/u.

## 2021-08-20 ENCOUNTER — TELEPHONE (OUTPATIENT)
Dept: PAIN MEDICINE | Facility: CLINIC | Age: 61
End: 2021-08-20

## 2021-08-20 DIAGNOSIS — G89.29 CHRONIC BILATERAL LOW BACK PAIN WITHOUT SCIATICA: ICD-10-CM

## 2021-08-20 DIAGNOSIS — M54.50 CHRONIC BILATERAL LOW BACK PAIN WITHOUT SCIATICA: ICD-10-CM

## 2021-08-20 RX ORDER — OXYCODONE HCL 20 MG/1
20 TABLET, FILM COATED, EXTENDED RELEASE ORAL NIGHTLY PRN
Qty: 30 TABLET | Refills: 0 | Status: CANCELLED | OUTPATIENT
Start: 2021-08-20

## 2021-08-20 NOTE — TELEPHONE ENCOUNTER
Caller: VERONICA ESPITIA    Relationship: SELF  Best call back number: [phone]  Medication needed: OXYCODONE ER  Requested Prescriptions      No prescriptions requested or ordered in this encounter       When do you need the refill by: ASAP    What additional details did the patient provide when requesting the medication: PATIENT THINKS IT MIGHT NEED AN INSURANCE AUTH    Does the patient have less than a 3 day supply:  [] Yes  [] No    What is the patient's preferred pharmacy:    [facility]

## 2021-08-23 ENCOUNTER — TELEPHONE (OUTPATIENT)
Dept: PAIN MEDICINE | Facility: CLINIC | Age: 61
End: 2021-08-23

## 2021-08-23 DIAGNOSIS — M54.50 CHRONIC BILATERAL LOW BACK PAIN WITHOUT SCIATICA: ICD-10-CM

## 2021-08-23 DIAGNOSIS — G89.29 CHRONIC BILATERAL LOW BACK PAIN WITHOUT SCIATICA: ICD-10-CM

## 2021-08-23 RX ORDER — OXYCODONE HCL 20 MG/1
20 TABLET, FILM COATED, EXTENDED RELEASE ORAL NIGHTLY PRN
Qty: 30 TABLET | Refills: 0 | Status: SHIPPED | OUTPATIENT
Start: 2021-08-23 | End: 2022-08-02

## 2021-08-23 NOTE — TELEPHONE ENCOUNTER
Patient stopped by to request that his prescription be sent to the [facility] in [place]. [place] did not have.

## 2021-08-23 NOTE — TELEPHONE ENCOUNTER
SPOUSE CALLED TO CHECK STATUS OF RX REFILL REQUEST- ADV THE REQUEST IS BEING PROCESSED- REQUESTING APPROVAL FOR [facility]- ADV SHE WILL GET A CALL BACK ONCE THE PRACTICE HAS RECEIVED INFO

## 2021-08-23 NOTE — TELEPHONE ENCOUNTER
Caller: BRIGITTE NUPUR    Relationship: WIFE    Best call back number:      Medication needed:   Requested Prescriptions      No prescriptions requested or ordered in this encounter   oxyCODONE ER (oxyCONTIN) 20 MG 12 hr tablet        When do you need the refill by: ASAP    What additional details did the patient provide when requesting the medication:  INSURANCE WILL NOT COVER FOR THE BRAND OF MEDICATION AND WANTS THAT FIGURED OUT    Does the patient have less than a 3 day supply:  [x] Yes  [] No    What is the patient's preferred pharmacy: [address]

## 2021-08-24 ENCOUNTER — TELEPHONE (OUTPATIENT)
Dept: PAIN MEDICINE | Facility: CLINIC | Age: 61
End: 2021-08-24

## 2021-08-24 NOTE — TELEPHONE ENCOUNTER
No, I tried and it changed Oxycontin to Oxycodone ER. Also, I don't know why it's typing in italics, but I don't know how to stop it, sorry.

## 2021-08-24 NOTE — TELEPHONE ENCOUNTER
Can you resend the Oxycodone ER as Oxycontin? Im not positive what is going on but the [facility] were at the front window today saying the pharmacy could not find Oxycodone ER. I did try to call [facility] several times each time Im on hold so long it hangs up on me.

## 2021-08-25 NOTE — TELEPHONE ENCOUNTER
Sure, let's have him take up to 5 tabs/day, and I'll refill early. Please have him call before he runs out. Thanks.

## 2021-08-25 NOTE — TELEPHONE ENCOUNTER
I finally spoke with the pharmacy this morning. They have been out of Oxycontin for over a month so it was not an issue with how you ordered it. When I talked with Anai she wants to know if you could just increase the Percocet and get rid of the Oxycontin all together since it is so hard to find?

## 2021-08-30 ENCOUNTER — TELEPHONE (OUTPATIENT)
Dept: PAIN MEDICINE | Facility: CLINIC | Age: 61
End: 2021-08-30

## 2021-08-30 DIAGNOSIS — G89.29 CHRONIC MIDLINE LOW BACK PAIN, UNSPECIFIED WHETHER SCIATICA PRESENT: ICD-10-CM

## 2021-08-30 DIAGNOSIS — M54.50 CHRONIC MIDLINE LOW BACK PAIN, UNSPECIFIED WHETHER SCIATICA PRESENT: ICD-10-CM

## 2021-08-30 RX ORDER — OXYCODONE AND ACETAMINOPHEN 10; 325 MG/1; MG/1
1 TABLET ORAL
Qty: 150 TABLET | Refills: 0 | Status: SHIPPED | OUTPATIENT
Start: 2021-08-30 | End: 2021-09-23 | Stop reason: SDUPTHER

## 2021-09-23 DIAGNOSIS — G89.29 CHRONIC MIDLINE LOW BACK PAIN, UNSPECIFIED WHETHER SCIATICA PRESENT: ICD-10-CM

## 2021-09-23 DIAGNOSIS — M54.50 CHRONIC MIDLINE LOW BACK PAIN, UNSPECIFIED WHETHER SCIATICA PRESENT: ICD-10-CM

## 2021-09-23 RX ORDER — OXYCODONE AND ACETAMINOPHEN 10; 325 MG/1; MG/1
1 TABLET ORAL
Qty: 150 TABLET | Refills: 0 | Status: SHIPPED | OUTPATIENT
Start: 2021-09-23 | End: 2021-10-21 | Stop reason: SDUPTHER

## 2021-10-21 DIAGNOSIS — M54.50 CHRONIC MIDLINE LOW BACK PAIN, UNSPECIFIED WHETHER SCIATICA PRESENT: ICD-10-CM

## 2021-10-21 DIAGNOSIS — G89.29 CHRONIC BILATERAL LOW BACK PAIN WITHOUT SCIATICA: ICD-10-CM

## 2021-10-21 DIAGNOSIS — M54.50 CHRONIC BILATERAL LOW BACK PAIN WITHOUT SCIATICA: ICD-10-CM

## 2021-10-21 DIAGNOSIS — G89.29 CHRONIC MIDLINE LOW BACK PAIN, UNSPECIFIED WHETHER SCIATICA PRESENT: ICD-10-CM

## 2021-10-21 RX ORDER — OXYCODONE AND ACETAMINOPHEN 10; 325 MG/1; MG/1
1 TABLET ORAL
Qty: 150 TABLET | Refills: 0 | Status: SHIPPED | OUTPATIENT
Start: 2021-10-21 | End: 2021-11-02 | Stop reason: SDUPTHER

## 2021-11-02 ENCOUNTER — OFFICE VISIT (OUTPATIENT)
Dept: PAIN MEDICINE | Facility: CLINIC | Age: 61
End: 2021-11-02

## 2021-11-02 VITALS — BODY MASS INDEX: 29 KG/M2 | HEIGHT: 74 IN | WEIGHT: 226 LBS

## 2021-11-02 DIAGNOSIS — M54.50 CHRONIC BILATERAL LOW BACK PAIN WITHOUT SCIATICA: Primary | ICD-10-CM

## 2021-11-02 DIAGNOSIS — M54.50 CHRONIC MIDLINE LOW BACK PAIN, UNSPECIFIED WHETHER SCIATICA PRESENT: ICD-10-CM

## 2021-11-02 DIAGNOSIS — G89.29 CHRONIC MIDLINE LOW BACK PAIN, UNSPECIFIED WHETHER SCIATICA PRESENT: ICD-10-CM

## 2021-11-02 DIAGNOSIS — G89.29 CHRONIC RIGHT SHOULDER PAIN: ICD-10-CM

## 2021-11-02 DIAGNOSIS — G89.29 CHRONIC BILATERAL LOW BACK PAIN WITHOUT SCIATICA: Primary | ICD-10-CM

## 2021-11-02 DIAGNOSIS — M54.2 PAIN OF CERVICAL FACET JOINT: ICD-10-CM

## 2021-11-02 DIAGNOSIS — M75.102 ROTATOR CUFF SYNDROME, LEFT: ICD-10-CM

## 2021-11-02 DIAGNOSIS — M51.36 DEGENERATION OF INTERVERTEBRAL DISC OF LUMBAR REGION: ICD-10-CM

## 2021-11-02 DIAGNOSIS — M25.512 CHRONIC LEFT SHOULDER PAIN: ICD-10-CM

## 2021-11-02 DIAGNOSIS — G89.29 CHRONIC LEFT SHOULDER PAIN: ICD-10-CM

## 2021-11-02 DIAGNOSIS — M54.2 NECK PAIN: ICD-10-CM

## 2021-11-02 DIAGNOSIS — Z79.899 OTHER LONG TERM (CURRENT) DRUG THERAPY: ICD-10-CM

## 2021-11-02 DIAGNOSIS — M25.511 CHRONIC RIGHT SHOULDER PAIN: ICD-10-CM

## 2021-11-02 DIAGNOSIS — R07.89 CHEST WALL PAIN: ICD-10-CM

## 2021-11-02 DIAGNOSIS — M48.02 SPINAL STENOSIS OF CERVICAL REGION: ICD-10-CM

## 2021-11-02 DIAGNOSIS — M67.912 ROTATOR CUFF DISORDER, LEFT: ICD-10-CM

## 2021-11-02 DIAGNOSIS — M50.30 DEGENERATION OF INTERVERTEBRAL DISC OF CERVICAL REGION: ICD-10-CM

## 2021-11-02 PROCEDURE — 99441 PR PHYS/QHP TELEPHONE EVALUATION 5-10 MIN: CPT | Performed by: PHYSICAL MEDICINE & REHABILITATION

## 2021-11-02 RX ORDER — METHYLPREDNISOLONE 4 MG/1
TABLET ORAL
COMMUNITY
Start: 2021-10-27

## 2021-11-02 RX ORDER — OXYCODONE AND ACETAMINOPHEN 10; 325 MG/1; MG/1
1 TABLET ORAL
Qty: 150 TABLET | Refills: 0 | Status: SHIPPED | OUTPATIENT
Start: 2021-11-02 | End: 2022-02-08 | Stop reason: SDUPTHER

## 2022-02-08 ENCOUNTER — OFFICE VISIT (OUTPATIENT)
Dept: PAIN MEDICINE | Facility: CLINIC | Age: 62
End: 2022-02-08

## 2022-02-08 VITALS
BODY MASS INDEX: 29 KG/M2 | RESPIRATION RATE: 16 BRPM | HEART RATE: 75 BPM | HEIGHT: 74 IN | SYSTOLIC BLOOD PRESSURE: 117 MMHG | DIASTOLIC BLOOD PRESSURE: 78 MMHG | WEIGHT: 226 LBS | OXYGEN SATURATION: 96 %

## 2022-02-08 DIAGNOSIS — G89.29 CHRONIC LEFT SHOULDER PAIN: ICD-10-CM

## 2022-02-08 DIAGNOSIS — M25.512 CHRONIC LEFT SHOULDER PAIN: ICD-10-CM

## 2022-02-08 DIAGNOSIS — M67.912 ROTATOR CUFF DISORDER, LEFT: ICD-10-CM

## 2022-02-08 DIAGNOSIS — G89.29 CHRONIC BILATERAL LOW BACK PAIN WITHOUT SCIATICA: ICD-10-CM

## 2022-02-08 DIAGNOSIS — M54.2 NECK PAIN: ICD-10-CM

## 2022-02-08 DIAGNOSIS — M50.30 DEGENERATION OF INTERVERTEBRAL DISC OF CERVICAL REGION: ICD-10-CM

## 2022-02-08 DIAGNOSIS — M51.36 DEGENERATION OF INTERVERTEBRAL DISC OF LUMBAR REGION: ICD-10-CM

## 2022-02-08 DIAGNOSIS — R07.89 CHEST WALL PAIN: ICD-10-CM

## 2022-02-08 DIAGNOSIS — M54.2 PAIN OF CERVICAL FACET JOINT: ICD-10-CM

## 2022-02-08 DIAGNOSIS — G89.29 CHRONIC RIGHT SHOULDER PAIN: ICD-10-CM

## 2022-02-08 DIAGNOSIS — M48.02 SPINAL STENOSIS OF CERVICAL REGION: ICD-10-CM

## 2022-02-08 DIAGNOSIS — M54.50 CHRONIC MIDLINE LOW BACK PAIN, UNSPECIFIED WHETHER SCIATICA PRESENT: Primary | ICD-10-CM

## 2022-02-08 DIAGNOSIS — M54.50 CHRONIC BILATERAL LOW BACK PAIN WITHOUT SCIATICA: ICD-10-CM

## 2022-02-08 DIAGNOSIS — M25.511 CHRONIC RIGHT SHOULDER PAIN: ICD-10-CM

## 2022-02-08 DIAGNOSIS — G89.29 CHRONIC MIDLINE LOW BACK PAIN, UNSPECIFIED WHETHER SCIATICA PRESENT: Primary | ICD-10-CM

## 2022-02-08 DIAGNOSIS — Z79.899 OTHER LONG TERM (CURRENT) DRUG THERAPY: ICD-10-CM

## 2022-02-08 PROCEDURE — 99213 OFFICE O/P EST LOW 20 MIN: CPT | Performed by: PHYSICAL MEDICINE & REHABILITATION

## 2022-02-08 RX ORDER — OXYCODONE AND ACETAMINOPHEN 10; 325 MG/1; MG/1
1 TABLET ORAL
Qty: 150 TABLET | Refills: 0 | Status: SHIPPED | OUTPATIENT
Start: 2022-02-08 | End: 2022-05-10 | Stop reason: SDUPTHER

## 2022-02-08 RX ORDER — TIZANIDINE 4 MG/1
4 TABLET ORAL NIGHTLY PRN
Qty: 60 TABLET | Refills: 11 | Status: SHIPPED | OUTPATIENT
Start: 2022-02-08 | End: 2022-08-02 | Stop reason: SDUPTHER

## 2022-02-08 NOTE — PROGRESS NOTES
Subjective   Christopher Nuñez is a 61 y.o. male.     mid to lower back tail bone pain, recent bypass 2/6-discharged 2/9. rt shoulder and back pain, coccydynia. Referred for LBP, also now R shoulder and neck pain after fall. 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 10/10 at worst, 9/10 at best, 8/10 today. No weakness or numbness, no b/b incontinence. Has tried PT and TENS without benefit, Oyxcodone caused nausea, Hydrocodone 10mg helps, failed lower doses and Tramadol, OTC NSAIDs and tylenol, OTC creams. CT C-spine shows multilevel DDD and DJD, stenosis, no acute injury. Started Norco 10/325mg BID prn with benefit, some days could use 3, could not fill compounded cream due to cost, started Pennsaid with excellent benefit, Pennsaid denied, ordered diclofenac solution instead. Was taking Norco 10/325mg 2-3x/day with good benefit overall until he tore mesh from an umbilical hernia repair, will likely have surgery soon, then QID for hernia pain and working 60 hours/week. Became tolerant of Norco, rotated to Percocet 7.5/325mg QID which is less effective, then 10/325mg QID. Normally effective, but chest still healing from CABG, went to ED, dz with broken wire, now all broken, repaired with sutures, broke, needs plate in Feb 2018. New L RTC injury, needs Ortho, MRI pending, pain too severe at night. Taking Percocet 10mg QID prn. Hard to sleep with neck pain.       The following portions of the patient's history were reviewed and updated as appropriate: allergies, current medications, past family history, past medical history, past social history, past surgical history and problem list.    Review of Systems   Constitutional: Negative for chills, fatigue and fever.   HENT: Positive for hearing loss. Negative for trouble swallowing.    Eyes: Negative for visual disturbance.   Respiratory: Negative for shortness of  breath.    Cardiovascular: Negative for chest pain.   Gastrointestinal: Negative for abdominal pain, constipation, diarrhea, nausea and vomiting.   Genitourinary: Negative for urinary incontinence.   Musculoskeletal: Positive for arthralgias, back pain and neck pain. Negative for joint swelling and myalgias.   Neurological: Negative for dizziness, weakness, numbness and headache.       Objective   Physical Exam   Constitutional: He is oriented to person, place, and time. He appears well-developed and well-nourished.   HENT:   Head: Normocephalic and atraumatic.   Eyes: Pupils are equal, round, and reactive to light.   Cardiovascular: Normal rate, regular rhythm and normal heart sounds.   Pulmonary/Chest: Breath sounds normal.   Abdominal: Soft. Bowel sounds are normal. He exhibits no distension. There is no abdominal tenderness.   Musculoskeletal:      Comments: Wearing b/l CTS braces   Neurological: He is alert and oriented to person, place, and time. He has normal reflexes. He displays normal reflexes. No sensory deficit.   Psychiatric: His behavior is normal. Thought content normal.         Assessment/Plan   Diagnoses and all orders for this visit:    1. Chronic midline low back pain, unspecified whether sciatica present (Primary)    2. Neck pain    3. Chest wall pain    4. Chronic left shoulder pain    5. Chronic bilateral low back pain without sciatica    6. Degeneration of intervertebral disc of cervical region    7. Degeneration of intervertebral disc of lumbar region    8. Other long term (current) drug therapy    9. Pain of cervical facet joint    10. Rotator cuff disorder, left    11. Chronic right shoulder pain    12. Spinal stenosis of cervical region        Inspect reviewed, in order. Low risk. Repeat UDS 8/17/21.  Cont Percocet 10/325mg #150, filled 1/28/22. Given his highly varible pain level tied to activity and injury, plan to keep on short-acting opioids to allow him to minimize opioid use as  tolerated. Added Oxycodone ER 20mg qHS, filled 7/23/21, doing better, will use sparingly in future.  Restarted Tizanidine 4-8mg qHS prn muscle pain, insomnia.  Cont other meds as prescribed.  Referral to Ortho for L RTC tear.  Had L subacromial injection.  Cont b/l CTS braces.  RTC 3 months for f/u.

## 2022-05-10 ENCOUNTER — OFFICE VISIT (OUTPATIENT)
Dept: PAIN MEDICINE | Facility: CLINIC | Age: 62
End: 2022-05-10

## 2022-05-10 VITALS
SYSTOLIC BLOOD PRESSURE: 124 MMHG | OXYGEN SATURATION: 96 % | RESPIRATION RATE: 16 BRPM | BODY MASS INDEX: 29 KG/M2 | HEART RATE: 71 BPM | DIASTOLIC BLOOD PRESSURE: 74 MMHG | WEIGHT: 226 LBS

## 2022-05-10 DIAGNOSIS — R07.89 CHEST WALL PAIN: ICD-10-CM

## 2022-05-10 DIAGNOSIS — G89.29 CHRONIC BILATERAL LOW BACK PAIN WITHOUT SCIATICA: Primary | ICD-10-CM

## 2022-05-10 DIAGNOSIS — M67.912 ROTATOR CUFF DISORDER, LEFT: ICD-10-CM

## 2022-05-10 DIAGNOSIS — M54.2 PAIN OF CERVICAL FACET JOINT: ICD-10-CM

## 2022-05-10 DIAGNOSIS — G89.29 CHRONIC LEFT SHOULDER PAIN: ICD-10-CM

## 2022-05-10 DIAGNOSIS — M54.50 CHRONIC BILATERAL LOW BACK PAIN WITHOUT SCIATICA: Primary | ICD-10-CM

## 2022-05-10 DIAGNOSIS — M54.50 CHRONIC MIDLINE LOW BACK PAIN, UNSPECIFIED WHETHER SCIATICA PRESENT: ICD-10-CM

## 2022-05-10 DIAGNOSIS — M54.2 NECK PAIN: ICD-10-CM

## 2022-05-10 DIAGNOSIS — M51.36 DEGENERATION OF INTERVERTEBRAL DISC OF LUMBAR REGION: ICD-10-CM

## 2022-05-10 DIAGNOSIS — G89.29 CHRONIC MIDLINE LOW BACK PAIN, UNSPECIFIED WHETHER SCIATICA PRESENT: ICD-10-CM

## 2022-05-10 DIAGNOSIS — M50.30 DEGENERATION OF INTERVERTEBRAL DISC OF CERVICAL REGION: ICD-10-CM

## 2022-05-10 DIAGNOSIS — M48.02 SPINAL STENOSIS OF CERVICAL REGION: ICD-10-CM

## 2022-05-10 DIAGNOSIS — M25.512 CHRONIC LEFT SHOULDER PAIN: ICD-10-CM

## 2022-05-10 DIAGNOSIS — Z79.899 OTHER LONG TERM (CURRENT) DRUG THERAPY: ICD-10-CM

## 2022-05-10 DIAGNOSIS — G89.29 CHRONIC RIGHT SHOULDER PAIN: ICD-10-CM

## 2022-05-10 DIAGNOSIS — M25.511 CHRONIC RIGHT SHOULDER PAIN: ICD-10-CM

## 2022-05-10 PROCEDURE — 99213 OFFICE O/P EST LOW 20 MIN: CPT | Performed by: PHYSICAL MEDICINE & REHABILITATION

## 2022-05-10 RX ORDER — OXYCODONE AND ACETAMINOPHEN 10; 325 MG/1; MG/1
1 TABLET ORAL
Qty: 150 TABLET | Refills: 0 | Status: SHIPPED | OUTPATIENT
Start: 2022-05-10 | End: 2022-08-02 | Stop reason: SDUPTHER

## 2022-05-10 NOTE — PROGRESS NOTES
Subjective   Christopher Nuñez is a 61 y.o. male.     mid to lower back tail bone pain, recent bypass 2/6-discharged 2/9. rt shoulder and back pain, coccydynia. Referred for LBP, also now R shoulder and neck pain after fall. 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 10/10 at worst, 9/10 at best, 8/10 today. No weakness or numbness, no b/b incontinence. Has tried PT and TENS without benefit, Oyxcodone caused nausea, Hydrocodone 10mg helps, failed lower doses and Tramadol, OTC NSAIDs and tylenol, OTC creams. CT C-spine shows multilevel DDD and DJD, stenosis, no acute injury. Started Norco 10/325mg BID prn with benefit, some days could use 3, could not fill compounded cream due to cost, started Pennsaid with excellent benefit, Pennsaid denied, ordered diclofenac solution instead. Was taking Norco 10/325mg 2-3x/day with good benefit overall until he tore mesh from an umbilical hernia repair, will likely have surgery soon, then QID for hernia pain and working 60 hours/week. Became tolerant of Norco, rotated to Percocet 7.5/325mg QID which is less effective, then 10/325mg QID. Normally effective, but chest still healing from CABG, went to ED, dz with broken wire, now all broken, repaired with sutures, broke, needs plate in Feb 2018. New L RTC injury, needs Ortho, MRI pending, pain too severe at night. Taking Percocet 10mg QID prn. Hard to sleep with neck pain.       The following portions of the patient's history were reviewed and updated as appropriate: allergies, current medications, past family history, past medical history, past social history, past surgical history and problem list.    Review of Systems   Constitutional: Negative for chills, fatigue and fever.   HENT: Positive for hearing loss. Negative for trouble swallowing.    Eyes: Negative for visual disturbance.   Respiratory: Negative for shortness of  breath.    Cardiovascular: Negative for chest pain.   Gastrointestinal: Negative for abdominal pain, constipation, diarrhea, nausea and vomiting.   Genitourinary: Negative for urinary incontinence.   Musculoskeletal: Positive for arthralgias, back pain and neck pain. Negative for joint swelling and myalgias.   Neurological: Negative for dizziness, weakness, numbness and headache.       Objective   Physical Exam   Constitutional: He is oriented to person, place, and time. He appears well-developed and well-nourished.   HENT:   Head: Normocephalic and atraumatic.   Eyes: Pupils are equal, round, and reactive to light.   Cardiovascular: Normal rate, regular rhythm and normal heart sounds.   Pulmonary/Chest: Breath sounds normal.   Abdominal: Soft. Bowel sounds are normal. He exhibits no distension. There is no abdominal tenderness.   Musculoskeletal:      Comments: Wearing b/l CTS braces   Neurological: He is alert and oriented to person, place, and time. He has normal reflexes. He displays normal reflexes. No sensory deficit.   Psychiatric: His behavior is normal. Thought content normal.         Assessment/Plan   Diagnoses and all orders for this visit:    1. Chronic bilateral low back pain without sciatica (Primary)    2. Neck pain    3. Chest wall pain    4. Chronic left shoulder pain    5. Degeneration of intervertebral disc of cervical region    6. Degeneration of intervertebral disc of lumbar region    7. Other long term (current) drug therapy    8. Pain of cervical facet joint    9. Rotator cuff disorder, left    10. Chronic right shoulder pain    11. Spinal stenosis of cervical region        Inspect reviewed, in order. Low risk. Repeat UDS 8/17/21.  Cont Percocet 10/325mg #150, filled 4/28/22. Given his highly varible pain level tied to activity and injury, plan to keep on short-acting opioids to allow him to minimize opioid use as tolerated. Added Oxycodone ER 20mg qHS, filled 7/23/21, doing better, will use  sparingly in future.  Restarted Tizanidine 4-8mg qHS prn muscle pain, insomnia.  Cont other meds as prescribed.  Referral to Ortho for L RTC tear.  Had L subacromial injection.  Cont b/l CTS braces.  RTC 3 months for f/u.

## 2022-08-02 ENCOUNTER — OFFICE VISIT (OUTPATIENT)
Dept: PAIN MEDICINE | Facility: CLINIC | Age: 62
End: 2022-08-02

## 2022-08-02 VITALS
DIASTOLIC BLOOD PRESSURE: 86 MMHG | HEART RATE: 80 BPM | SYSTOLIC BLOOD PRESSURE: 124 MMHG | RESPIRATION RATE: 16 BRPM | OXYGEN SATURATION: 96 %

## 2022-08-02 DIAGNOSIS — G89.29 CHRONIC MIDLINE LOW BACK PAIN, UNSPECIFIED WHETHER SCIATICA PRESENT: ICD-10-CM

## 2022-08-02 DIAGNOSIS — R07.89 CHEST WALL PAIN: ICD-10-CM

## 2022-08-02 DIAGNOSIS — M48.02 SPINAL STENOSIS OF CERVICAL REGION: ICD-10-CM

## 2022-08-02 DIAGNOSIS — M25.511 CHRONIC RIGHT SHOULDER PAIN: ICD-10-CM

## 2022-08-02 DIAGNOSIS — G89.29 CHRONIC LEFT SHOULDER PAIN: ICD-10-CM

## 2022-08-02 DIAGNOSIS — M54.2 NECK PAIN: ICD-10-CM

## 2022-08-02 DIAGNOSIS — M54.50 CHRONIC BILATERAL LOW BACK PAIN WITHOUT SCIATICA: ICD-10-CM

## 2022-08-02 DIAGNOSIS — M54.50 CHRONIC MIDLINE LOW BACK PAIN, UNSPECIFIED WHETHER SCIATICA PRESENT: ICD-10-CM

## 2022-08-02 DIAGNOSIS — M50.30 DEGENERATION OF INTERVERTEBRAL DISC OF CERVICAL REGION: ICD-10-CM

## 2022-08-02 DIAGNOSIS — M25.512 CHRONIC LEFT SHOULDER PAIN: ICD-10-CM

## 2022-08-02 DIAGNOSIS — G89.29 CHRONIC RIGHT SHOULDER PAIN: ICD-10-CM

## 2022-08-02 DIAGNOSIS — Z79.899 HIGH RISK MEDICATION USE: Primary | ICD-10-CM

## 2022-08-02 DIAGNOSIS — G89.29 CHRONIC BILATERAL LOW BACK PAIN WITHOUT SCIATICA: ICD-10-CM

## 2022-08-02 DIAGNOSIS — M51.36 DEGENERATION OF INTERVERTEBRAL DISC OF LUMBAR REGION: ICD-10-CM

## 2022-08-02 DIAGNOSIS — M54.2 PAIN OF CERVICAL FACET JOINT: ICD-10-CM

## 2022-08-02 DIAGNOSIS — Z79.899 OTHER LONG TERM (CURRENT) DRUG THERAPY: ICD-10-CM

## 2022-08-02 DIAGNOSIS — M67.912 ROTATOR CUFF DISORDER, LEFT: ICD-10-CM

## 2022-08-02 PROCEDURE — 99214 OFFICE O/P EST MOD 30 MIN: CPT | Performed by: PHYSICAL MEDICINE & REHABILITATION

## 2022-08-02 RX ORDER — OXYCODONE AND ACETAMINOPHEN 10; 325 MG/1; MG/1
1 TABLET ORAL
Qty: 150 TABLET | Refills: 0 | Status: SHIPPED | OUTPATIENT
Start: 2022-08-02 | End: 2022-11-01 | Stop reason: SDUPTHER

## 2022-08-02 RX ORDER — TIZANIDINE 4 MG/1
8 TABLET ORAL NIGHTLY PRN
Qty: 60 TABLET | Refills: 11 | Status: SHIPPED | OUTPATIENT
Start: 2022-08-02

## 2022-08-02 NOTE — PROGRESS NOTES
Subjective   Christopher Nuñez is a 61 y.o. male.     mid to lower back tail bone pain, recent bypass 2/6-discharged 2/9. rt shoulder and back pain, coccydynia. Referred for LBP, also now R shoulder and neck pain after fall. 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 10/10 at worst, 9/10 at best, 8/10 today. No weakness or numbness, no b/b incontinence. Has tried PT and TENS without benefit, Oyxcodone caused nausea, Hydrocodone 10mg helps, failed lower doses and Tramadol, OTC NSAIDs and tylenol, OTC creams. CT C-spine shows multilevel DDD and DJD, stenosis, no acute injury. Started Norco 10/325mg BID prn with benefit, some days could use 3, could not fill compounded cream due to cost, started Pennsaid with excellent benefit, Pennsaid denied, ordered diclofenac solution instead. Was taking Norco 10/325mg 2-3x/day with good benefit overall until he tore mesh from an umbilical hernia repair, will likely have surgery soon, then QID for hernia pain and working 60 hours/week. Became tolerant of Norco, rotated to Percocet 7.5/325mg QID which is less effective, then 10/325mg QID. Normally effective, but chest still healing from CABG, went to ED, dz with broken wire, now all broken, repaired with sutures, broke, needs plate in Feb 2018. New L RTC injury, needs Ortho, MRI pending, pain too severe at night. Taking Percocet 10mg QID prn. Hard to sleep with neck pain.       The following portions of the patient's history were reviewed and updated as appropriate: allergies, current medications, past family history, past medical history, past social history, past surgical history and problem list.    Review of Systems   Constitutional: Negative for chills, fatigue and fever.   HENT: Positive for hearing loss. Negative for trouble swallowing.    Eyes: Negative for visual disturbance.   Respiratory: Negative for shortness of  breath.    Cardiovascular: Negative for chest pain.   Gastrointestinal: Negative for abdominal pain, constipation, diarrhea, nausea and vomiting.   Genitourinary: Negative for urinary incontinence.   Musculoskeletal: Positive for arthralgias, back pain and neck pain. Negative for joint swelling and myalgias.   Neurological: Negative for dizziness, weakness, numbness and headache.       Objective   Physical Exam   Constitutional: He is oriented to person, place, and time. He appears well-developed and well-nourished.   HENT:   Head: Normocephalic and atraumatic.   Eyes: Pupils are equal, round, and reactive to light.   Cardiovascular: Normal rate, regular rhythm and normal heart sounds.   Pulmonary/Chest: Breath sounds normal.   Abdominal: Soft. Bowel sounds are normal. He exhibits no distension. There is no abdominal tenderness.   Musculoskeletal:      Comments: Wearing b/l CTS braces   Neurological: He is alert and oriented to person, place, and time. He has normal reflexes. He displays normal reflexes. No sensory deficit.   Psychiatric: His behavior is normal. Thought content normal.         Assessment/Plan   Diagnoses and all orders for this visit:    1. Chronic midline low back pain, unspecified whether sciatica present (Primary)    2. Neck pain    3. Chest wall pain    4. Chronic left shoulder pain    5. Degeneration of intervertebral disc of cervical region    6. Degeneration of intervertebral disc of lumbar region    7. Other long term (current) drug therapy    8. Pain of cervical facet joint    9. Rotator cuff disorder, left    10. Chronic right shoulder pain    11. Spinal stenosis of cervical region        Inspect reviewed, in order. Low risk. Repeat UDS 8/17/21.  Cont Percocet 10/325mg #150, filled 7/27/22. Given his highly varible pain level tied to activity and injury, plan to keep on short-acting opioids to allow him to minimize opioid use as tolerated. Added Oxycodone ER 20mg qHS, filled 7/23/21, doing  better, will use sparingly in future.  Increase to Tizanidine 8mg qHS, #60, prn muscle pain, insomnia.  Cont other meds as prescribed.  Referral to Ortho for L RTC tear.  Had L subacromial injection.  Cont b/l CTS braces.  RTC 3 months for f/u.     ADD: UDS (+) for small amount of THC likely c/w use of CBD products, will d/w patient.

## 2022-11-01 ENCOUNTER — OFFICE VISIT (OUTPATIENT)
Dept: PAIN MEDICINE | Facility: CLINIC | Age: 62
End: 2022-11-01

## 2022-11-01 VITALS
HEART RATE: 62 BPM | SYSTOLIC BLOOD PRESSURE: 142 MMHG | RESPIRATION RATE: 16 BRPM | DIASTOLIC BLOOD PRESSURE: 78 MMHG | OXYGEN SATURATION: 96 %

## 2022-11-01 DIAGNOSIS — M54.50 CHRONIC MIDLINE LOW BACK PAIN, UNSPECIFIED WHETHER SCIATICA PRESENT: ICD-10-CM

## 2022-11-01 DIAGNOSIS — M25.511 CHRONIC RIGHT SHOULDER PAIN: ICD-10-CM

## 2022-11-01 DIAGNOSIS — M50.30 DEGENERATION OF INTERVERTEBRAL DISC OF CERVICAL REGION: ICD-10-CM

## 2022-11-01 DIAGNOSIS — G89.29 CHRONIC LEFT SHOULDER PAIN: ICD-10-CM

## 2022-11-01 DIAGNOSIS — G89.29 CHRONIC BILATERAL LOW BACK PAIN WITHOUT SCIATICA: Primary | ICD-10-CM

## 2022-11-01 DIAGNOSIS — M54.2 NECK PAIN: ICD-10-CM

## 2022-11-01 DIAGNOSIS — M51.36 DEGENERATION OF INTERVERTEBRAL DISC OF LUMBAR REGION: ICD-10-CM

## 2022-11-01 DIAGNOSIS — G89.29 CHRONIC RIGHT SHOULDER PAIN: ICD-10-CM

## 2022-11-01 DIAGNOSIS — M54.2 PAIN OF CERVICAL FACET JOINT: ICD-10-CM

## 2022-11-01 DIAGNOSIS — M48.02 SPINAL STENOSIS OF CERVICAL REGION: ICD-10-CM

## 2022-11-01 DIAGNOSIS — G89.29 CHRONIC MIDLINE LOW BACK PAIN, UNSPECIFIED WHETHER SCIATICA PRESENT: ICD-10-CM

## 2022-11-01 DIAGNOSIS — R07.89 CHEST WALL PAIN: ICD-10-CM

## 2022-11-01 DIAGNOSIS — Z79.899 OTHER LONG TERM (CURRENT) DRUG THERAPY: ICD-10-CM

## 2022-11-01 DIAGNOSIS — M67.912 ROTATOR CUFF DISORDER, LEFT: ICD-10-CM

## 2022-11-01 DIAGNOSIS — M54.50 CHRONIC BILATERAL LOW BACK PAIN WITHOUT SCIATICA: Primary | ICD-10-CM

## 2022-11-01 DIAGNOSIS — M25.512 CHRONIC LEFT SHOULDER PAIN: ICD-10-CM

## 2022-11-01 PROCEDURE — 99213 OFFICE O/P EST LOW 20 MIN: CPT | Performed by: PHYSICAL MEDICINE & REHABILITATION

## 2022-11-01 RX ORDER — OXYCODONE AND ACETAMINOPHEN 10; 325 MG/1; MG/1
1 TABLET ORAL
Qty: 150 TABLET | Refills: 0 | Status: SHIPPED | OUTPATIENT
Start: 2022-11-01 | End: 2022-11-02 | Stop reason: SDUPTHER

## 2022-11-01 RX ORDER — OXYCODONE AND ACETAMINOPHEN 10; 325 MG/1; MG/1
1 TABLET ORAL
Qty: 150 TABLET | Refills: 0 | Status: SHIPPED | OUTPATIENT
Start: 2022-11-01 | End: 2023-01-27 | Stop reason: SDUPTHER

## 2022-11-01 RX ORDER — OXYCODONE AND ACETAMINOPHEN 10; 325 MG/1; MG/1
1 TABLET ORAL
Qty: 150 TABLET | Refills: 0 | Status: SHIPPED | OUTPATIENT
Start: 2022-11-01

## 2022-11-01 NOTE — PROGRESS NOTES
Subjective   Christopher Nuñez is a 61 y.o. male.     History of Present Illness  mid to lower back tail bone pain, recent bypass 2/6-discharged 2/9. rt shoulder and back pain, coccydynia. Referred for LBP, also now R shoulder and neck pain after fall. 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 10/10 at worst, 9/10 at best, 5/10 today. No weakness or numbness, no b/b incontinence. Has tried PT and TENS without benefit, Oyxcodone caused nausea, Hydrocodone 10mg helps, failed lower doses and Tramadol, OTC NSAIDs and tylenol, OTC creams. CT C-spine shows multilevel DDD and DJD, stenosis, no acute injury. Started Norco 10/325mg BID prn with benefit, some days could use 3, could not fill compounded cream due to cost, started Pennsaid with excellent benefit, Pennsaid denied, ordered diclofenac solution instead. Was taking Norco 10/325mg 2-3x/day with good benefit overall until he tore mesh from an umbilical hernia repair, will likely have surgery soon, then QID for hernia pain and working 60 hours/week. Became tolerant of Norco, rotated to Percocet 7.5/325mg QID which is less effective, then 10/325mg QID. Normally effective, but chest still healing from CABG, went to ED, dz with broken wire, now all broken, repaired with sutures, broke, needs plate in Feb 2018. New L RTC injury, needs Ortho, MRI pending, pain too severe at night. Taking Percocet 10mg QID prn. Hard to sleep with neck pain.       The following portions of the patient's history were reviewed and updated as appropriate: allergies, current medications, past family history, past medical history, past social history, past surgical history and problem list.    Review of Systems   Constitutional: Negative for chills, fatigue and fever.   HENT: Positive for hearing loss. Negative for trouble swallowing.    Eyes: Negative for visual disturbance.   Respiratory:  Negative for shortness of breath.    Cardiovascular: Negative for chest pain.   Gastrointestinal: Negative for abdominal pain, constipation, diarrhea, nausea and vomiting.   Genitourinary: Negative for urinary incontinence.   Musculoskeletal: Positive for arthralgias, back pain and neck pain. Negative for joint swelling and myalgias.   Neurological: Negative for dizziness, weakness, numbness and headache.       Objective   Physical Exam   Constitutional: He is oriented to person, place, and time. He appears well-developed and well-nourished.   HENT:   Head: Normocephalic and atraumatic.   Eyes: Pupils are equal, round, and reactive to light.   Cardiovascular: Normal rate, regular rhythm and normal heart sounds.   Pulmonary/Chest: Breath sounds normal.   Abdominal: Soft. Bowel sounds are normal. He exhibits no distension. There is no abdominal tenderness.   Musculoskeletal:      Comments: Wearing b/l CTS braces   Neurological: He is alert and oriented to person, place, and time. He has normal reflexes. He displays normal reflexes. No sensory deficit.   Psychiatric: His behavior is normal. Thought content normal.         Assessment/Plan   Diagnoses and all orders for this visit:    1. Chronic bilateral low back pain without sciatica (Primary)    2. Chronic left shoulder pain    3. Chest wall pain    4. Degeneration of intervertebral disc of cervical region    5. Degeneration of intervertebral disc of lumbar region    6. Neck pain    7. Other long term (current) drug therapy    8. Pain of cervical facet joint    9. Rotator cuff disorder, left    10. Chronic right shoulder pain    11. Spinal stenosis of cervical region        Inspect reviewed, in order. Low risk. Repeat UDS 8/2/22 (+) for small amount of THC c/w use of CBD products  Cont Percocet 10/325mg #150, filled 10/25/22. Given his highly varible pain level tied to activity and injury, plan to keep on short-acting opioids to allow him to minimize opioid use as  tolerated. Added Oxycodone ER 20mg qHS, filled 7/23/21, doing better, will use sparingly in future.  Increased to Tizanidine 8mg qHS, #60, prn muscle pain, insomnia.  Cont other meds as prescribed.  Referral to Ortho for L RTC tear. Had L subacromial injection.  Cont b/l CTS braces.  RTC 3 months for f/u.

## 2022-11-02 DIAGNOSIS — M54.50 CHRONIC MIDLINE LOW BACK PAIN, UNSPECIFIED WHETHER SCIATICA PRESENT: ICD-10-CM

## 2022-11-02 DIAGNOSIS — G89.29 CHRONIC MIDLINE LOW BACK PAIN, UNSPECIFIED WHETHER SCIATICA PRESENT: ICD-10-CM

## 2022-11-02 RX ORDER — OXYCODONE AND ACETAMINOPHEN 10; 325 MG/1; MG/1
1 TABLET ORAL
Qty: 150 TABLET | Refills: 0 | Status: SHIPPED | OUTPATIENT
Start: 2022-11-02 | End: 2023-01-27 | Stop reason: SDUPTHER

## 2023-01-27 ENCOUNTER — OFFICE VISIT (OUTPATIENT)
Dept: PAIN MEDICINE | Facility: CLINIC | Age: 63
End: 2023-01-27
Payer: MEDICARE

## 2023-01-27 VITALS
OXYGEN SATURATION: 94 % | HEART RATE: 93 BPM | SYSTOLIC BLOOD PRESSURE: 121 MMHG | DIASTOLIC BLOOD PRESSURE: 82 MMHG | RESPIRATION RATE: 16 BRPM

## 2023-01-27 DIAGNOSIS — M50.30 DEGENERATION OF INTERVERTEBRAL DISC OF CERVICAL REGION: ICD-10-CM

## 2023-01-27 DIAGNOSIS — M48.02 SPINAL STENOSIS OF CERVICAL REGION: ICD-10-CM

## 2023-01-27 DIAGNOSIS — R07.89 CHEST WALL PAIN: ICD-10-CM

## 2023-01-27 DIAGNOSIS — G89.29 CHRONIC RIGHT SHOULDER PAIN: ICD-10-CM

## 2023-01-27 DIAGNOSIS — M17.0 PRIMARY OSTEOARTHRITIS OF BOTH KNEES: ICD-10-CM

## 2023-01-27 DIAGNOSIS — Z79.899 OTHER LONG TERM (CURRENT) DRUG THERAPY: ICD-10-CM

## 2023-01-27 DIAGNOSIS — G89.29 CHRONIC MIDLINE LOW BACK PAIN, UNSPECIFIED WHETHER SCIATICA PRESENT: ICD-10-CM

## 2023-01-27 DIAGNOSIS — G89.29 CHRONIC BILATERAL LOW BACK PAIN WITHOUT SCIATICA: Primary | ICD-10-CM

## 2023-01-27 DIAGNOSIS — M25.511 CHRONIC RIGHT SHOULDER PAIN: ICD-10-CM

## 2023-01-27 DIAGNOSIS — M54.50 CHRONIC BILATERAL LOW BACK PAIN WITHOUT SCIATICA: Primary | ICD-10-CM

## 2023-01-27 DIAGNOSIS — M67.912 ROTATOR CUFF DISORDER, LEFT: ICD-10-CM

## 2023-01-27 DIAGNOSIS — M54.50 CHRONIC MIDLINE LOW BACK PAIN, UNSPECIFIED WHETHER SCIATICA PRESENT: ICD-10-CM

## 2023-01-27 DIAGNOSIS — M25.562 CHRONIC PAIN OF BOTH KNEES: ICD-10-CM

## 2023-01-27 DIAGNOSIS — M75.102 ROTATOR CUFF SYNDROME, LEFT: ICD-10-CM

## 2023-01-27 DIAGNOSIS — M25.512 CHRONIC LEFT SHOULDER PAIN: ICD-10-CM

## 2023-01-27 DIAGNOSIS — M51.36 DEGENERATION OF INTERVERTEBRAL DISC OF LUMBAR REGION: ICD-10-CM

## 2023-01-27 DIAGNOSIS — M25.561 CHRONIC PAIN OF BOTH KNEES: ICD-10-CM

## 2023-01-27 DIAGNOSIS — G89.29 CHRONIC LEFT SHOULDER PAIN: ICD-10-CM

## 2023-01-27 DIAGNOSIS — G89.29 CHRONIC PAIN OF BOTH KNEES: ICD-10-CM

## 2023-01-27 DIAGNOSIS — M54.2 NECK PAIN: ICD-10-CM

## 2023-01-27 DIAGNOSIS — M54.2 PAIN OF CERVICAL FACET JOINT: ICD-10-CM

## 2023-01-27 PROCEDURE — 99214 OFFICE O/P EST MOD 30 MIN: CPT | Performed by: PHYSICAL MEDICINE & REHABILITATION

## 2023-01-27 RX ORDER — OXYCODONE AND ACETAMINOPHEN 10; 325 MG/1; MG/1
1 TABLET ORAL
Qty: 150 TABLET | Refills: 0 | Status: SHIPPED | OUTPATIENT
Start: 2023-01-27

## 2023-01-27 NOTE — PROGRESS NOTES
Subjective   Christopher Nuñez is a 62 y.o. male.     History of Present Illness  mid to lower back tail bone pain, recent bypass 2/6-discharged 2/9. rt shoulder and back pain, coccydynia. Referred for LBP, also now R shoulder and neck pain after fall. 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 10/10 at worst, 9/10 at best, 5/10 today. No weakness or numbness, no b/b incontinence. Has tried PT and TENS without benefit, Oyxcodone caused nausea, Hydrocodone 10mg helps, failed lower doses and Tramadol, OTC NSAIDs and tylenol, OTC creams. CT C-spine shows multilevel DDD and DJD, stenosis, no acute injury. Started Norco 10/325mg BID prn with benefit, some days could use 3, could not fill compounded cream due to cost, started Pennsaid with excellent benefit, Pennsaid denied, ordered diclofenac solution instead. Was taking Norco 10/325mg 2-3x/day with good benefit overall until he tore mesh from an umbilical hernia repair, will likely have surgery soon, then QID for hernia pain and working 60 hours/week. Became tolerant of Norco, rotated to Percocet 7.5/325mg QID which is less effective, then 10/325mg QID. Normally effective, but chest still healing from CABG, went to ED, dz with broken wire, now all broken, repaired with sutures, broke, needs plate in Feb 2018. New L RTC injury, needs Ortho, MRI pending, pain too severe at night. Taking Percocet 10mg QID prn. Hard to sleep with neck pain.       The following portions of the patient's history were reviewed and updated as appropriate: allergies, current medications, past family history, past medical history, past social history, past surgical history and problem list.    Review of Systems   Constitutional: Negative for chills, fatigue and fever.   HENT: Positive for hearing loss. Negative for trouble swallowing.    Eyes: Negative for visual disturbance.   Respiratory:  Negative for shortness of breath.    Cardiovascular: Negative for chest pain.   Gastrointestinal: Negative for abdominal pain, constipation, diarrhea, nausea and vomiting.   Genitourinary: Negative for urinary incontinence.   Musculoskeletal: Positive for arthralgias, back pain and neck pain. Negative for joint swelling and myalgias.   Neurological: Negative for dizziness, weakness, numbness and headache.       Objective   Physical Exam   Constitutional: He is oriented to person, place, and time. He appears well-developed and well-nourished.   HENT:   Head: Normocephalic and atraumatic.   Eyes: Pupils are equal, round, and reactive to light.   Cardiovascular: Normal rate, regular rhythm and normal heart sounds.   Pulmonary/Chest: Breath sounds normal.   Abdominal: Soft. Bowel sounds are normal. He exhibits no distension. There is no abdominal tenderness.   Musculoskeletal:      Comments: Wearing b/l CTS braces   Neurological: He is alert and oriented to person, place, and time. He has normal reflexes. He displays normal reflexes. No sensory deficit.   Psychiatric: His behavior is normal. Thought content normal.         Assessment/Plan   Diagnoses and all orders for this visit:    1. Chronic bilateral low back pain without sciatica (Primary)    2. Neck pain    3. Chest wall pain    4. Chronic left shoulder pain    5. Degeneration of intervertebral disc of cervical region    6. Degeneration of intervertebral disc of lumbar region    7. Other long term (current) drug therapy    8. Pain of cervical facet joint    9. Rotator cuff disorder, left    10. Rotator cuff syndrome, left    11. Chronic right shoulder pain    12. Spinal stenosis of cervical region        Inspect reviewed, in order. Low risk. Repeat UDS 8/2/22 (+) for small amount of THC c/w use of CBD products  Cont Percocet 10/325mg #150, filled 1/23/23. Given his highly varible pain level tied to activity and injury, plan to keep on short-acting opioids to allow  him to minimize opioid use as tolerated. Added Oxycodone ER 20mg qHS, filled 7/23/21, doing better, will use sparingly in future.  Increased to Tizanidine 8mg qHS, #60, prn muscle pain, insomnia.  Cont other meds as prescribed.  Referral to Ortho for L RTC tear. Had L subacromial injection.  Schedule L knee Monovisc injections, failed steroid injection in past with only 2-3 days relief.  Cont b/l CTS braces.  RTC for knee inj then in 3 months for f/u.

## 2023-04-21 ENCOUNTER — OFFICE VISIT (OUTPATIENT)
Dept: PAIN MEDICINE | Facility: CLINIC | Age: 63
End: 2023-04-21
Payer: MEDICARE

## 2023-04-21 VITALS
SYSTOLIC BLOOD PRESSURE: 145 MMHG | OXYGEN SATURATION: 96 % | DIASTOLIC BLOOD PRESSURE: 85 MMHG | HEART RATE: 75 BPM | RESPIRATION RATE: 16 BRPM

## 2023-04-21 DIAGNOSIS — M54.2 NECK PAIN: ICD-10-CM

## 2023-04-21 DIAGNOSIS — M25.511 CHRONIC RIGHT SHOULDER PAIN: ICD-10-CM

## 2023-04-21 DIAGNOSIS — M54.50 CHRONIC BILATERAL LOW BACK PAIN WITHOUT SCIATICA: Primary | ICD-10-CM

## 2023-04-21 DIAGNOSIS — M25.561 CHRONIC PAIN OF BOTH KNEES: ICD-10-CM

## 2023-04-21 DIAGNOSIS — M51.36 DEGENERATION OF INTERVERTEBRAL DISC OF LUMBAR REGION: ICD-10-CM

## 2023-04-21 DIAGNOSIS — M48.02 SPINAL STENOSIS OF CERVICAL REGION: ICD-10-CM

## 2023-04-21 DIAGNOSIS — G89.29 CHRONIC MIDLINE LOW BACK PAIN, UNSPECIFIED WHETHER SCIATICA PRESENT: ICD-10-CM

## 2023-04-21 DIAGNOSIS — M25.512 CHRONIC LEFT SHOULDER PAIN: ICD-10-CM

## 2023-04-21 DIAGNOSIS — G89.29 CHRONIC PAIN OF BOTH KNEES: ICD-10-CM

## 2023-04-21 DIAGNOSIS — M25.562 CHRONIC PAIN OF BOTH KNEES: ICD-10-CM

## 2023-04-21 DIAGNOSIS — R07.89 CHEST WALL PAIN: ICD-10-CM

## 2023-04-21 DIAGNOSIS — M17.0 PRIMARY OSTEOARTHRITIS OF BOTH KNEES: ICD-10-CM

## 2023-04-21 DIAGNOSIS — G89.29 CHRONIC BILATERAL LOW BACK PAIN WITHOUT SCIATICA: Primary | ICD-10-CM

## 2023-04-21 DIAGNOSIS — G89.29 CHRONIC LEFT SHOULDER PAIN: ICD-10-CM

## 2023-04-21 DIAGNOSIS — M67.912 ROTATOR CUFF DISORDER, LEFT: ICD-10-CM

## 2023-04-21 DIAGNOSIS — G89.29 CHRONIC RIGHT SHOULDER PAIN: ICD-10-CM

## 2023-04-21 DIAGNOSIS — Z79.899 OTHER LONG TERM (CURRENT) DRUG THERAPY: ICD-10-CM

## 2023-04-21 DIAGNOSIS — M54.2 PAIN OF CERVICAL FACET JOINT: ICD-10-CM

## 2023-04-21 DIAGNOSIS — M54.50 CHRONIC MIDLINE LOW BACK PAIN, UNSPECIFIED WHETHER SCIATICA PRESENT: ICD-10-CM

## 2023-04-21 DIAGNOSIS — M50.30 DEGENERATION OF INTERVERTEBRAL DISC OF CERVICAL REGION: ICD-10-CM

## 2023-04-21 RX ORDER — OXYCODONE AND ACETAMINOPHEN 10; 325 MG/1; MG/1
1 TABLET ORAL
Qty: 150 TABLET | Refills: 0 | Status: SHIPPED | OUTPATIENT
Start: 2023-04-21

## 2023-04-21 NOTE — PROGRESS NOTES
Subjective   Christopher Nuñez is a 62 y.o. male.     History of Present Illness  mid to lower back tail bone pain, recent bypass 2/6-discharged 2/9. rt shoulder and back pain, coccydynia. Referred for LBP, also now R shoulder and neck pain after fall. 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 10/10 at worst, 9/10 at best. No weakness or numbness, no b/b incontinence. Has tried PT and TENS without benefit, Oyxcodone caused nausea, Hydrocodone 10mg helps, failed lower doses and Tramadol, OTC NSAIDs and tylenol, OTC creams. CT C-spine shows multilevel DDD and DJD, stenosis, no acute injury. Started Norco 10/325mg BID prn with benefit, some days could use 3, could not fill compounded cream due to cost, started Pennsaid with excellent benefit, Pennsaid denied, ordered diclofenac solution instead. Was taking Norco 10/325mg 2-3x/day with good benefit overall until he tore mesh from an umbilical hernia repair, will likely have surgery soon, then QID for hernia pain and working 60 hours/week. Became tolerant of Norco, rotated to Percocet 7.5/325mg QID which is less effective, then 10/325mg QID. Normally effective, but chest still healing from CABG, went to ED, dz with broken wire, now all broken, repaired with sutures, broke, needs plate in Feb 2018. New L RTC injury, needs Ortho, MRI pending, pain too severe at night. Taking Percocet 10mg QID prn. Hard to sleep with neck pain. L knee pain worsening, failed steroid injection with only 2-3 days relief, asking about gel injections.  Back Pain  Pertinent negatives include no abdominal pain, bladder incontinence, chest pain, fever, numbness or weakness.        The following portions of the patient's history were reviewed and updated as appropriate: allergies, current medications, past family history, past medical history, past social history, past surgical history and  problem list.    Review of Systems   Constitutional: Negative for chills, fatigue and fever.   HENT: Positive for hearing loss. Negative for trouble swallowing.    Eyes: Negative for visual disturbance.   Respiratory: Negative for shortness of breath.    Cardiovascular: Negative for chest pain.   Gastrointestinal: Negative for abdominal pain, constipation, diarrhea, nausea and vomiting.   Genitourinary: Negative for urinary incontinence.   Musculoskeletal: Positive for arthralgias, back pain and neck pain. Negative for joint swelling and myalgias.   Neurological: Negative for dizziness, weakness, numbness and headache.       Objective   Physical Exam   Constitutional: He is oriented to person, place, and time. He appears well-developed and well-nourished.   HENT:   Head: Normocephalic and atraumatic.   Eyes: Pupils are equal, round, and reactive to light.   Cardiovascular: Normal rate, regular rhythm and normal heart sounds.   Pulmonary/Chest: Breath sounds normal.   Abdominal: Soft. Bowel sounds are normal. He exhibits no distension. There is no abdominal tenderness.   Musculoskeletal:      Comments: Wearing b/l CTS braces   Neurological: He is alert and oriented to person, place, and time. He has normal reflexes. He displays normal reflexes. No sensory deficit.   Psychiatric: His behavior is normal. Thought content normal.         Assessment/Plan   Diagnoses and all orders for this visit:    1. Chronic bilateral low back pain without sciatica (Primary)    2. Chronic pain of both knees    3. Chronic left shoulder pain    4. Chest wall pain    5. Degeneration of intervertebral disc of cervical region    6. Degeneration of intervertebral disc of lumbar region    7. Neck pain    8. Other long term (current) drug therapy    9. Pain of cervical facet joint    10. Primary osteoarthritis of both knees    11. Rotator cuff disorder, left    12. Chronic right shoulder pain    13. Spinal stenosis of cervical  region        Inspect reviewed, in order. Low risk. Repeat UDS 8/2/22 (+) for small amount of THC c/w use of CBD products  Cont Percocet 10/325mg #150, filled 4/19/23. Given his highly varible pain level tied to activity and injury, plan to keep on short-acting opioids to allow him to minimize opioid use as tolerated. Added Oxycodone ER 20mg qHS, filled 7/23/21, doing better, will use sparingly in future.  Increased to Tizanidine 8mg qHS, #60, prn muscle pain, insomnia.  Cont other meds as prescribed.  Referral to Ortho for L RTC tear. Had L subacromial injection.  Schedule L knee Monovisc injection, failed steroid injection in past with only 2-3 days relief.  Cont b/l CTS braces.  RTC for knee inj then in 3 months for f/u.

## 2023-07-28 ENCOUNTER — OFFICE VISIT (OUTPATIENT)
Dept: PAIN MEDICINE | Facility: CLINIC | Age: 63
End: 2023-07-28
Payer: MEDICARE

## 2023-07-28 VITALS
HEART RATE: 76 BPM | RESPIRATION RATE: 16 BRPM | DIASTOLIC BLOOD PRESSURE: 83 MMHG | OXYGEN SATURATION: 99 % | SYSTOLIC BLOOD PRESSURE: 138 MMHG

## 2023-07-28 DIAGNOSIS — G89.29 CHRONIC RIGHT SHOULDER PAIN: ICD-10-CM

## 2023-07-28 DIAGNOSIS — R07.89 CHEST WALL PAIN: ICD-10-CM

## 2023-07-28 DIAGNOSIS — M54.2 NECK PAIN: ICD-10-CM

## 2023-07-28 DIAGNOSIS — M54.50 CHRONIC BILATERAL LOW BACK PAIN WITHOUT SCIATICA: Primary | ICD-10-CM

## 2023-07-28 DIAGNOSIS — G89.29 CHRONIC BILATERAL LOW BACK PAIN WITHOUT SCIATICA: Primary | ICD-10-CM

## 2023-07-28 DIAGNOSIS — G89.29 CHRONIC LEFT SHOULDER PAIN: ICD-10-CM

## 2023-07-28 DIAGNOSIS — M51.36 DEGENERATION OF INTERVERTEBRAL DISC OF LUMBAR REGION: ICD-10-CM

## 2023-07-28 DIAGNOSIS — Z79.899 HIGH RISK MEDICATION USE: Primary | ICD-10-CM

## 2023-07-28 DIAGNOSIS — M54.50 CHRONIC MIDLINE LOW BACK PAIN, UNSPECIFIED WHETHER SCIATICA PRESENT: ICD-10-CM

## 2023-07-28 DIAGNOSIS — M25.561 CHRONIC PAIN OF BOTH KNEES: ICD-10-CM

## 2023-07-28 DIAGNOSIS — M54.2 PAIN OF CERVICAL FACET JOINT: ICD-10-CM

## 2023-07-28 DIAGNOSIS — M25.511 CHRONIC RIGHT SHOULDER PAIN: ICD-10-CM

## 2023-07-28 DIAGNOSIS — M25.562 CHRONIC PAIN OF BOTH KNEES: ICD-10-CM

## 2023-07-28 DIAGNOSIS — M48.02 SPINAL STENOSIS OF CERVICAL REGION: ICD-10-CM

## 2023-07-28 DIAGNOSIS — M25.512 CHRONIC LEFT SHOULDER PAIN: ICD-10-CM

## 2023-07-28 DIAGNOSIS — G89.29 CHRONIC MIDLINE LOW BACK PAIN, UNSPECIFIED WHETHER SCIATICA PRESENT: ICD-10-CM

## 2023-07-28 DIAGNOSIS — G89.29 CHRONIC PAIN OF BOTH KNEES: ICD-10-CM

## 2023-07-28 DIAGNOSIS — M17.0 PRIMARY OSTEOARTHRITIS OF BOTH KNEES: ICD-10-CM

## 2023-07-28 DIAGNOSIS — M67.912 ROTATOR CUFF DISORDER, LEFT: ICD-10-CM

## 2023-07-28 DIAGNOSIS — Z79.899 OTHER LONG TERM (CURRENT) DRUG THERAPY: ICD-10-CM

## 2023-07-28 DIAGNOSIS — M50.30 DEGENERATION OF INTERVERTEBRAL DISC OF CERVICAL REGION: ICD-10-CM

## 2023-07-28 RX ORDER — OXYCODONE AND ACETAMINOPHEN 10; 325 MG/1; MG/1
1 TABLET ORAL
Qty: 150 TABLET | Refills: 0 | Status: SHIPPED | OUTPATIENT
Start: 2023-07-28

## 2023-07-28 NOTE — PROGRESS NOTES
Subjective   Christopher Nuñez is a 62 y.o. male.     History of Present Illness  mid to lower back tail bone pain, recent bypass 2/6-discharged 2/9. rt shoulder and back pain, coccydynia. Referred for LBP, also now R shoulder and neck pain after fall. 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 10/10 at worst, 9/10 at best. No weakness or numbness, no b/b incontinence. Has tried PT and TENS without benefit, Oyxcodone caused nausea, Hydrocodone 10mg helps, failed lower doses and Tramadol, OTC NSAIDs and tylenol, OTC creams. CT C-spine shows multilevel DDD and DJD, stenosis, no acute injury. Started Norco 10/325mg BID prn with benefit, some days could use 3, could not fill compounded cream due to cost, started Pennsaid with excellent benefit, Pennsaid denied, ordered diclofenac solution instead. Was taking Norco 10/325mg 2-3x/day with good benefit overall until he tore mesh from an umbilical hernia repair, will likely have surgery soon, then QID for hernia pain and working 60 hours/week. Became tolerant of Norco, rotated to Percocet 7.5/325mg QID which is less effective, then 10/325mg QID. Normally effective, but chest still healing from CABG, went to ED, dz with broken wire, now all broken, repaired with sutures, broke, needs plate in Feb 2018. New L RTC injury, needs Ortho, MRI pending, pain too severe at night. Taking Percocet 10mg QID prn. Hard to sleep with neck pain. L knee pain worsening, failed steroid injection with only 2-3 days relief, asking about gel injections.  Back Pain  Pertinent negatives include no abdominal pain, bladder incontinence, chest pain, fever, numbness or weakness.      The following portions of the patient's history were reviewed and updated as appropriate: allergies, current medications, past family history, past medical history, past social history, past surgical history and  problem list.    Review of Systems   Constitutional:  Negative for chills, fatigue and fever.   HENT:  Positive for hearing loss. Negative for trouble swallowing.    Eyes:  Negative for visual disturbance.   Respiratory:  Negative for shortness of breath.    Cardiovascular:  Negative for chest pain.   Gastrointestinal:  Negative for abdominal pain, constipation, diarrhea, nausea and vomiting.   Genitourinary:  Negative for urinary incontinence.   Musculoskeletal:  Positive for arthralgias, back pain and neck pain. Negative for joint swelling and myalgias.   Neurological:  Negative for dizziness, weakness, numbness and headache.     Objective   Physical Exam   Constitutional: He is oriented to person, place, and time. He appears well-developed and well-nourished.   HENT:   Head: Normocephalic and atraumatic.   Eyes: Pupils are equal, round, and reactive to light.   Cardiovascular: Normal rate, regular rhythm and normal heart sounds.   Pulmonary/Chest: Breath sounds normal.   Abdominal: Soft. Bowel sounds are normal. He exhibits no distension. There is no abdominal tenderness.   Musculoskeletal:      Comments: Wearing b/l CTS braces   Neurological: He is alert and oriented to person, place, and time. He has normal reflexes. He displays normal reflexes. No sensory deficit.   Psychiatric: His behavior is normal. Thought content normal.       Assessment/Plan   Diagnoses and all orders for this visit:    1. Chronic bilateral low back pain without sciatica (Primary)    2. Chest wall pain    3. Chronic left shoulder pain    4. Chronic pain of both knees    5. Degeneration of intervertebral disc of cervical region    6. Degeneration of intervertebral disc of lumbar region    7. Neck pain    8. Other long term (current) drug therapy    9. Pain of cervical facet joint    10. Primary osteoarthritis of both knees    11. Rotator cuff disorder, left    12. Chronic right shoulder pain    13. Spinal stenosis of cervical  region        Inspect reviewed, in order. Low risk. Repeat UDS 8/2/22 (+) for small amount of THC c/w use of CBD products, repeat today, does believe he may have accidentally taken a single Hydrocodone by accident, will expect on UDS.  Cont Percocet 10/325mg #150, filled 7/17/23. Given his highly varible pain level tied to activity and injury, plan to keep on short-acting opioids to allow him to minimize opioid use as tolerated. Added Oxycodone ER 20mg qHS, filled 7/23/21, doing better, will use sparingly in future.  Increased to Tizanidine 8mg qHS, #60, prn muscle pain, insomnia.  Cont other meds as prescribed.  Referral to Ortho for L RTC tear. Had L subacromial injection.  Schedule L knee Monovisc injection, failed steroid injection in past with only 2-3 days relief.  Cont b/l CTS braces.  RTC for knee inj then in 3 months for f/u.

## 2023-08-25 NOTE — PROGRESS NOTES
Subjective   Christopher Nuñez is a 60 y.o. male.     mid to lower back tail bone pain, recent bypass 2/6-discharged 2/9. rt shoulder and back pain, coccydynia. Referred for LBP, also now R shoulder and neck pain after fall. 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 10/10 at worst, 9/10 at best, 8/10 today. No weakness or numbness, no b/b incontinence. Has tried PT and TENS without benefit, Oyxcodone caused nausea, Hydrocodone 10mg helps, failed lower doses and Tramadol, OTC NSAIDs and tylenol, OTC creams. CT C-spine shows multilevel DDD and DJD, stenosis, no acute injury. Started Norco 10/325mg BID prn with benefit, some days could use 3, could not fill compounded cream due to cost, started Pennsaid with excellent benefit, Pennsaid denied, ordered diclofenac solution instead. Was taking Norco 10/325mg 2-3x/day with good benefit overall until he tore mesh from an umbilical hernia repair, will likely have surgery soon, then QID for hernia pain and working 60 hours/week. Became tolerant of Norco, rotated to Percocet 7.5/325mg QID which is less effective, then 10/325mg QID. Normally effective, but chest still healing from CABG, went to ED, dz with broken wire, now all broken, repaired with sutures, broke, needs plate in Feb 2018. New L RTC injury, needs Ortho, MRI pending, pain too severe at night. Taking Percocet 10mg QID prn. Hard to sleep with neck pain.       The following portions of the patient's history were reviewed and updated as appropriate: allergies, current medications, past family history, past medical history, past social history, past surgical history and problem list.    Review of Systems   Constitutional: Negative for chills, fatigue and fever.   HENT: Positive for hearing loss. Negative for trouble swallowing.    Eyes: Negative for visual disturbance.   Respiratory: Negative for shortness of  breath.    Cardiovascular: Negative for chest pain.   Gastrointestinal: Negative for abdominal pain, constipation, diarrhea, nausea and vomiting.   Genitourinary: Negative for urinary incontinence.   Musculoskeletal: Positive for arthralgias, back pain and neck pain. Negative for joint swelling and myalgias.   Neurological: Negative for dizziness, weakness, numbness and headache.       Objective   Physical Exam   Constitutional: He is oriented to person, place, and time. He appears well-developed and well-nourished.   Neurological: He is alert and oriented to person, place, and time. He has normal reflexes.   Psychiatric: His behavior is normal. Mood, judgment and thought content normal.         Assessment/Plan   Diagnoses and all orders for this visit:    1. Chronic bilateral low back pain without sciatica (Primary)    2. Chronic left shoulder pain    3. Degeneration of intervertebral disc of cervical region    4. Degeneration of intervertebral disc of lumbar region    5. Neck pain    6. Other long term (current) drug therapy    7. Pain of cervical facet joint    8. Rotator cuff disorder, left    9. Rotator cuff syndrome, left    10. Chronic right shoulder pain    11. Spinal stenosis of cervical region    12. Chest wall pain        Inspect reviewed, in order. Low risk. Repeat UDS 8/17/21.  Cont Percocet 10/325mg #120, filled 10/29/21. Given his highly varible pain level tied to activity and injury, plan to keep on short-acting opioids to allow him to minimize opioid use as tolerated. Added Oxycodone ER 20mg qHS, filled 7/23/21, doing better, will use sparingly in future.  Restarted Tizanidine 4-8mg qHS prn muscle pain, insomnia.  Cont other meds as prescribed.  Referral to Ortho for L RTC tear.  Had L subacromial injection.  Cont b/l CTS braces.  RTC 3 months for f/u. Telephone visit, spent 6 minutes discussing his plan of care and medications.              negative...

## 2023-10-24 ENCOUNTER — OFFICE VISIT (OUTPATIENT)
Dept: PAIN MEDICINE | Facility: CLINIC | Age: 63
End: 2023-10-24
Payer: MEDICARE

## 2023-10-24 VITALS
DIASTOLIC BLOOD PRESSURE: 79 MMHG | HEART RATE: 65 BPM | SYSTOLIC BLOOD PRESSURE: 125 MMHG | OXYGEN SATURATION: 97 % | RESPIRATION RATE: 16 BRPM

## 2023-10-24 DIAGNOSIS — Z79.899 OTHER LONG TERM (CURRENT) DRUG THERAPY: ICD-10-CM

## 2023-10-24 DIAGNOSIS — M54.50 CHRONIC BILATERAL LOW BACK PAIN WITHOUT SCIATICA: Primary | ICD-10-CM

## 2023-10-24 DIAGNOSIS — M50.30 DEGENERATION OF INTERVERTEBRAL DISC OF CERVICAL REGION: ICD-10-CM

## 2023-10-24 DIAGNOSIS — R07.89 CHEST WALL PAIN: ICD-10-CM

## 2023-10-24 DIAGNOSIS — M54.2 PAIN OF CERVICAL FACET JOINT: ICD-10-CM

## 2023-10-24 DIAGNOSIS — M67.912 ROTATOR CUFF DISORDER, LEFT: ICD-10-CM

## 2023-10-24 DIAGNOSIS — G89.29 CHRONIC BILATERAL LOW BACK PAIN WITHOUT SCIATICA: Primary | ICD-10-CM

## 2023-10-24 DIAGNOSIS — M54.50 CHRONIC MIDLINE LOW BACK PAIN, UNSPECIFIED WHETHER SCIATICA PRESENT: ICD-10-CM

## 2023-10-24 DIAGNOSIS — M25.562 CHRONIC PAIN OF BOTH KNEES: ICD-10-CM

## 2023-10-24 DIAGNOSIS — M48.02 SPINAL STENOSIS OF CERVICAL REGION: ICD-10-CM

## 2023-10-24 DIAGNOSIS — M17.0 PRIMARY OSTEOARTHRITIS OF BOTH KNEES: ICD-10-CM

## 2023-10-24 DIAGNOSIS — M25.511 CHRONIC RIGHT SHOULDER PAIN: ICD-10-CM

## 2023-10-24 DIAGNOSIS — G89.29 CHRONIC MIDLINE LOW BACK PAIN, UNSPECIFIED WHETHER SCIATICA PRESENT: ICD-10-CM

## 2023-10-24 DIAGNOSIS — M51.36 DEGENERATION OF INTERVERTEBRAL DISC OF LUMBAR REGION: ICD-10-CM

## 2023-10-24 DIAGNOSIS — G89.29 CHRONIC RIGHT SHOULDER PAIN: ICD-10-CM

## 2023-10-24 DIAGNOSIS — G89.29 CHRONIC LEFT SHOULDER PAIN: ICD-10-CM

## 2023-10-24 DIAGNOSIS — G89.29 CHRONIC PAIN OF BOTH KNEES: ICD-10-CM

## 2023-10-24 DIAGNOSIS — M25.512 CHRONIC LEFT SHOULDER PAIN: ICD-10-CM

## 2023-10-24 DIAGNOSIS — M25.561 CHRONIC PAIN OF BOTH KNEES: ICD-10-CM

## 2023-10-24 DIAGNOSIS — M54.2 NECK PAIN: ICD-10-CM

## 2023-10-24 PROCEDURE — 1159F MED LIST DOCD IN RCRD: CPT | Performed by: PHYSICAL MEDICINE & REHABILITATION

## 2023-10-24 PROCEDURE — 1160F RVW MEDS BY RX/DR IN RCRD: CPT | Performed by: PHYSICAL MEDICINE & REHABILITATION

## 2023-10-24 PROCEDURE — 1125F AMNT PAIN NOTED PAIN PRSNT: CPT | Performed by: PHYSICAL MEDICINE & REHABILITATION

## 2023-10-24 PROCEDURE — 99213 OFFICE O/P EST LOW 20 MIN: CPT | Performed by: PHYSICAL MEDICINE & REHABILITATION

## 2023-10-24 RX ORDER — OXYCODONE AND ACETAMINOPHEN 10; 325 MG/1; MG/1
1 TABLET ORAL
Qty: 150 TABLET | Refills: 0 | Status: SHIPPED | OUTPATIENT
Start: 2023-10-24

## 2023-10-24 NOTE — PROGRESS NOTES
Subjective   Christopher Nuñez is a 62 y.o. male.     History of Present Illness  mid to lower back tail bone pain, recent bypass 2/6-discharged 2/9. rt shoulder and back pain, coccydynia. Referred for LBP, also now R shoulder and neck pain after fall. 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 10/10 at worst, 9/10 at best. No weakness or numbness, no b/b incontinence. Has tried PT and TENS without benefit, Oyxcodone caused nausea, Hydrocodone 10mg helps, failed lower doses and Tramadol, OTC NSAIDs and tylenol, OTC creams. CT C-spine shows multilevel DDD and DJD, stenosis, no acute injury. Started Norco 10/325mg BID prn with benefit, some days could use 3, could not fill compounded cream due to cost, started Pennsaid with excellent benefit, Pennsaid denied, ordered diclofenac solution instead. Was taking Norco 10/325mg 2-3x/day with good benefit overall until he tore mesh from an umbilical hernia repair, will likely have surgery soon, then QID for hernia pain and working 60 hours/week. Became tolerant of Norco, rotated to Percocet 7.5/325mg QID which is less effective, then 10/325mg QID. Normally effective, but chest still healing from CABG, went to ED, dz with broken wire, now all broken, repaired with sutures, broke, needs plate in Feb 2018. New L RTC injury, needs Ortho, MRI pending, pain too severe at night. Taking Percocet 10mg QID prn. Hard to sleep with neck pain. L knee pain worsening, failed steroid injection with only 2-3 days relief, asking about gel injections.  Back Pain  Pertinent negatives include no abdominal pain, bladder incontinence, chest pain, fever, numbness or weakness.        The following portions of the patient's history were reviewed and updated as appropriate: allergies, current medications, past family history, past medical history, past social history, past surgical history and  problem list.    Review of Systems   Constitutional:  Negative for chills, fatigue and fever.   HENT:  Positive for hearing loss. Negative for trouble swallowing.    Eyes:  Negative for visual disturbance.   Respiratory:  Negative for shortness of breath.    Cardiovascular:  Negative for chest pain.   Gastrointestinal:  Negative for abdominal pain, constipation, diarrhea, nausea and vomiting.   Genitourinary:  Negative for urinary incontinence.   Musculoskeletal:  Positive for arthralgias, back pain and neck pain. Negative for joint swelling and myalgias.   Neurological:  Negative for dizziness, weakness, numbness and headache.       Objective   Physical Exam   Constitutional: He is oriented to person, place, and time. He appears well-developed and well-nourished.   HENT:   Head: Normocephalic and atraumatic.   Eyes: Pupils are equal, round, and reactive to light.   Cardiovascular: Normal rate, regular rhythm and normal heart sounds.   Pulmonary/Chest: Breath sounds normal.   Abdominal: Soft. Bowel sounds are normal. He exhibits no distension. There is no abdominal tenderness.   Musculoskeletal:      Comments: Wearing b/l CTS braces   Neurological: He is alert and oriented to person, place, and time. He has normal reflexes. He displays normal reflexes. No sensory deficit.   Psychiatric: His behavior is normal. Thought content normal.         Assessment/Plan   Diagnoses and all orders for this visit:    1. Chronic bilateral low back pain without sciatica (Primary)    2. Chronic left shoulder pain    3. Chest wall pain    4. Chronic pain of both knees    5. Degeneration of intervertebral disc of cervical region    6. Degeneration of intervertebral disc of lumbar region    7. Neck pain    8. Other long term (current) drug therapy    9. Pain of cervical facet joint    10. Primary osteoarthritis of both knees    11. Rotator cuff disorder, left    12. Chronic right shoulder pain    13. Spinal stenosis of cervical  region        Inspect reviewed, in order. Low risk. Repeat UDS 8/2/22 (+) for small amount of THC c/w use of CBD products, repeat 7/28/23 in order.  Cont Percocet 10/325mg #150, filled 10/15/23. Given his highly varible pain level tied to activity and injury, plan to keep on short-acting opioids to allow him to minimize opioid use as tolerated. Added Oxycodone ER 20mg qHS, filled 7/23/21, doing better, will use sparingly in future.  Increased to Tizanidine 8mg qHS, #60, prn muscle pain, insomnia.  Cont other meds as prescribed.  Referral to Ortho for L RTC tear. Had L subacromial injection.  Schedule L knee Monovisc injection, failed steroid injection in past with only 2-3 days relief.  Cont b/l CTS braces.  RTC for knee inj then in 3 months for f/u.

## 2024-01-09 ENCOUNTER — OFFICE VISIT (OUTPATIENT)
Dept: PAIN MEDICINE | Facility: CLINIC | Age: 64
End: 2024-01-09
Payer: MEDICARE

## 2024-01-09 VITALS
OXYGEN SATURATION: 97 % | HEART RATE: 74 BPM | SYSTOLIC BLOOD PRESSURE: 152 MMHG | WEIGHT: 217 LBS | DIASTOLIC BLOOD PRESSURE: 93 MMHG | BODY MASS INDEX: 27.85 KG/M2 | RESPIRATION RATE: 16 BRPM

## 2024-01-09 DIAGNOSIS — M54.50 CHRONIC MIDLINE LOW BACK PAIN, UNSPECIFIED WHETHER SCIATICA PRESENT: ICD-10-CM

## 2024-01-09 DIAGNOSIS — G89.29 CHRONIC BILATERAL LOW BACK PAIN WITHOUT SCIATICA: Primary | ICD-10-CM

## 2024-01-09 DIAGNOSIS — M17.0 PRIMARY OSTEOARTHRITIS OF BOTH KNEES: ICD-10-CM

## 2024-01-09 DIAGNOSIS — M25.562 CHRONIC PAIN OF BOTH KNEES: ICD-10-CM

## 2024-01-09 DIAGNOSIS — M25.561 CHRONIC PAIN OF BOTH KNEES: ICD-10-CM

## 2024-01-09 DIAGNOSIS — M25.511 CHRONIC RIGHT SHOULDER PAIN: ICD-10-CM

## 2024-01-09 DIAGNOSIS — M67.912 ROTATOR CUFF DISORDER, LEFT: ICD-10-CM

## 2024-01-09 DIAGNOSIS — G89.29 CHRONIC PAIN OF BOTH KNEES: ICD-10-CM

## 2024-01-09 DIAGNOSIS — Z79.899 OTHER LONG TERM (CURRENT) DRUG THERAPY: ICD-10-CM

## 2024-01-09 DIAGNOSIS — M25.512 CHRONIC LEFT SHOULDER PAIN: ICD-10-CM

## 2024-01-09 DIAGNOSIS — M51.36 DEGENERATION OF INTERVERTEBRAL DISC OF LUMBAR REGION: ICD-10-CM

## 2024-01-09 DIAGNOSIS — M54.2 PAIN OF CERVICAL FACET JOINT: ICD-10-CM

## 2024-01-09 DIAGNOSIS — M54.50 CHRONIC BILATERAL LOW BACK PAIN WITHOUT SCIATICA: Primary | ICD-10-CM

## 2024-01-09 DIAGNOSIS — G89.29 CHRONIC LEFT SHOULDER PAIN: ICD-10-CM

## 2024-01-09 DIAGNOSIS — R07.89 CHEST WALL PAIN: ICD-10-CM

## 2024-01-09 DIAGNOSIS — M54.2 NECK PAIN: ICD-10-CM

## 2024-01-09 DIAGNOSIS — G89.29 CHRONIC MIDLINE LOW BACK PAIN, UNSPECIFIED WHETHER SCIATICA PRESENT: ICD-10-CM

## 2024-01-09 DIAGNOSIS — M48.02 SPINAL STENOSIS OF CERVICAL REGION: ICD-10-CM

## 2024-01-09 DIAGNOSIS — M50.30 DEGENERATION OF INTERVERTEBRAL DISC OF CERVICAL REGION: ICD-10-CM

## 2024-01-09 DIAGNOSIS — G89.29 CHRONIC RIGHT SHOULDER PAIN: ICD-10-CM

## 2024-01-09 RX ORDER — OXYCODONE AND ACETAMINOPHEN 10; 325 MG/1; MG/1
1 TABLET ORAL
Qty: 150 TABLET | Refills: 0 | Status: SHIPPED | OUTPATIENT
Start: 2024-01-09

## 2024-01-09 RX ORDER — ALBUTEROL SULFATE 90 UG/1
2 AEROSOL, METERED RESPIRATORY (INHALATION)
COMMUNITY
Start: 2023-11-09 | End: 2024-11-08

## 2024-01-09 RX ORDER — METHOCARBAMOL 500 MG/1
1 TABLET, FILM COATED ORAL EVERY 6 HOURS
COMMUNITY
Start: 2023-11-02

## 2024-01-09 RX ORDER — QUETIAPINE FUMARATE 50 MG/1
50 TABLET, FILM COATED ORAL
COMMUNITY

## 2024-01-09 NOTE — PROGRESS NOTES
Subjective   Christopher Nuñez is a 63 y.o. male.     History of Present Illness  mid to lower back tail bone pain, recent bypass 2/6-discharged 2/9. rt shoulder and back pain, coccydynia. Referred for LBP, also now R shoulder and neck pain after fall. 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 10/10 at worst, 9/10 at best. No weakness or numbness, no b/b incontinence. Has tried PT and TENS without benefit, Oyxcodone caused nausea, Hydrocodone 10mg helps, failed lower doses and Tramadol, OTC NSAIDs and tylenol, OTC creams. CT C-spine shows multilevel DDD and DJD, stenosis, no acute injury. Started Norco 10/325mg BID prn with benefit, some days could use 3, could not fill compounded cream due to cost, started Pennsaid with excellent benefit, Pennsaid denied, ordered diclofenac solution instead. Was taking Norco 10/325mg 2-3x/day with good benefit overall until he tore mesh from an umbilical hernia repair, will likely have surgery soon, then QID for hernia pain and working 60 hours/week. Became tolerant of Norco, rotated to Percocet 7.5/325mg QID which is less effective, then 10/325mg QID. Normally effective, but chest still healing from CABG, went to ED, dz with broken wire, now all broken, repaired with sutures, broke, needs plate in Feb 2018. New L RTC injury, needs Ortho, MRI pending, pain too severe at night. Taking Percocet 10mg QID prn. Hard to sleep with neck pain. L knee pain worsening, failed steroid injection with only 2-3 days relief, asking about gel injections.  Back Pain  Pertinent negatives include no abdominal pain, bladder incontinence, chest pain, fever, numbness or weakness.        The following portions of the patient's history were reviewed and updated as appropriate: allergies, current medications, past family history, past medical history, past social history, past surgical history and  problem list.    Review of Systems   Constitutional:  Negative for chills, fatigue and fever.   HENT:  Positive for hearing loss. Negative for trouble swallowing.    Eyes:  Negative for visual disturbance.   Respiratory:  Negative for shortness of breath.    Cardiovascular:  Negative for chest pain.   Gastrointestinal:  Negative for abdominal pain, constipation, diarrhea, nausea and vomiting.   Genitourinary:  Negative for urinary incontinence.   Musculoskeletal:  Positive for arthralgias, back pain and neck pain. Negative for joint swelling and myalgias.   Neurological:  Negative for dizziness, weakness, numbness and headache.       Objective   Physical Exam   Constitutional: He is oriented to person, place, and time. He appears well-developed and well-nourished.   HENT:   Head: Normocephalic and atraumatic.   Eyes: Pupils are equal, round, and reactive to light.   Cardiovascular: Normal rate, regular rhythm and normal heart sounds.   Pulmonary/Chest: Breath sounds normal.   Abdominal: Soft. Bowel sounds are normal. He exhibits no distension. There is no abdominal tenderness.   Musculoskeletal:      Comments: Wearing b/l CTS braces   Neurological: He is alert and oriented to person, place, and time. He has normal reflexes. He displays normal reflexes. No sensory deficit.   Psychiatric: His behavior is normal. Thought content normal.         Assessment/Plan   Diagnoses and all orders for this visit:    1. Chronic bilateral low back pain without sciatica (Primary)    2. Chronic left shoulder pain    3. Chest wall pain    4. Chronic pain of both knees    5. Degeneration of intervertebral disc of cervical region    6. Degeneration of intervertebral disc of lumbar region    7. Neck pain    8. Other long term (current) drug therapy    9. Pain of cervical facet joint    10. Primary osteoarthritis of both knees    11. Rotator cuff disorder, left    12. Spinal stenosis of cervical region    13. Chronic right shoulder  pain        Inspect reviewed, in order. Low risk. Repeat UDS 8/2/22 (+) for small amount of THC c/w use of CBD products, repeat 7/28/23 in order.  Cont Percocet 10/325mg #150, filled 12/14/23. Given his highly varible pain level tied to activity and injury, plan to keep on short-acting opioids to allow him to minimize opioid use as tolerated. Added Oxycodone ER 20mg qHS, filled 7/23/21, doing better, will use sparingly in future.  Increased to Tizanidine 8mg qHS, #60, prn muscle pain, insomnia.  Cont other meds as prescribed.  Referral to Ortho for L RTC tear. Had L subacromial injection.  Schedule L knee Monovisc injection, failed steroid injection in past with only 2-3 days relief.  Cont b/l CTS braces.  RTC for knee inj then in 3 months for f/u.

## 2024-04-09 ENCOUNTER — OFFICE VISIT (OUTPATIENT)
Dept: PAIN MEDICINE | Facility: CLINIC | Age: 64
End: 2024-04-09
Payer: MEDICARE

## 2024-04-09 VITALS
SYSTOLIC BLOOD PRESSURE: 130 MMHG | OXYGEN SATURATION: 94 % | RESPIRATION RATE: 16 BRPM | HEART RATE: 73 BPM | DIASTOLIC BLOOD PRESSURE: 91 MMHG | WEIGHT: 220.7 LBS | BODY MASS INDEX: 28.32 KG/M2

## 2024-04-09 DIAGNOSIS — M25.511 CHRONIC RIGHT SHOULDER PAIN: ICD-10-CM

## 2024-04-09 DIAGNOSIS — G89.29 CHRONIC MIDLINE LOW BACK PAIN, UNSPECIFIED WHETHER SCIATICA PRESENT: ICD-10-CM

## 2024-04-09 DIAGNOSIS — M54.50 CHRONIC BILATERAL LOW BACK PAIN WITHOUT SCIATICA: Primary | ICD-10-CM

## 2024-04-09 DIAGNOSIS — G89.29 CHRONIC LEFT SHOULDER PAIN: ICD-10-CM

## 2024-04-09 DIAGNOSIS — G89.29 CHRONIC BILATERAL LOW BACK PAIN WITHOUT SCIATICA: Primary | ICD-10-CM

## 2024-04-09 DIAGNOSIS — G89.29 CHRONIC PAIN OF BOTH KNEES: ICD-10-CM

## 2024-04-09 DIAGNOSIS — M50.30 DEGENERATION OF INTERVERTEBRAL DISC OF CERVICAL REGION: ICD-10-CM

## 2024-04-09 DIAGNOSIS — M25.562 CHRONIC PAIN OF BOTH KNEES: ICD-10-CM

## 2024-04-09 DIAGNOSIS — M25.561 CHRONIC PAIN OF BOTH KNEES: ICD-10-CM

## 2024-04-09 DIAGNOSIS — M17.0 PRIMARY OSTEOARTHRITIS OF BOTH KNEES: ICD-10-CM

## 2024-04-09 DIAGNOSIS — M75.102 ROTATOR CUFF SYNDROME, LEFT: ICD-10-CM

## 2024-04-09 DIAGNOSIS — G89.29 CHRONIC RIGHT SHOULDER PAIN: ICD-10-CM

## 2024-04-09 DIAGNOSIS — M54.2 PAIN OF CERVICAL FACET JOINT: ICD-10-CM

## 2024-04-09 DIAGNOSIS — M25.512 CHRONIC LEFT SHOULDER PAIN: ICD-10-CM

## 2024-04-09 DIAGNOSIS — M48.02 SPINAL STENOSIS OF CERVICAL REGION: ICD-10-CM

## 2024-04-09 DIAGNOSIS — M54.2 NECK PAIN: ICD-10-CM

## 2024-04-09 DIAGNOSIS — Z79.899 OTHER LONG TERM (CURRENT) DRUG THERAPY: ICD-10-CM

## 2024-04-09 DIAGNOSIS — R07.89 CHEST WALL PAIN: ICD-10-CM

## 2024-04-09 DIAGNOSIS — M54.50 CHRONIC MIDLINE LOW BACK PAIN, UNSPECIFIED WHETHER SCIATICA PRESENT: ICD-10-CM

## 2024-04-09 DIAGNOSIS — M51.36 DEGENERATION OF INTERVERTEBRAL DISC OF LUMBAR REGION: ICD-10-CM

## 2024-04-09 PROCEDURE — 1160F RVW MEDS BY RX/DR IN RCRD: CPT | Performed by: PHYSICAL MEDICINE & REHABILITATION

## 2024-04-09 PROCEDURE — 99213 OFFICE O/P EST LOW 20 MIN: CPT | Performed by: PHYSICAL MEDICINE & REHABILITATION

## 2024-04-09 PROCEDURE — 1125F AMNT PAIN NOTED PAIN PRSNT: CPT | Performed by: PHYSICAL MEDICINE & REHABILITATION

## 2024-04-09 PROCEDURE — 1159F MED LIST DOCD IN RCRD: CPT | Performed by: PHYSICAL MEDICINE & REHABILITATION

## 2024-04-09 RX ORDER — OXYCODONE AND ACETAMINOPHEN 10; 325 MG/1; MG/1
1 TABLET ORAL
Qty: 150 TABLET | Refills: 0 | Status: SHIPPED | OUTPATIENT
Start: 2024-04-09

## 2024-04-09 NOTE — PROGRESS NOTES
Subjective   Christopher Nuñez is a 63 y.o. male.     History of Present Illness  mid to lower back tail bone pain, recent bypass 2/6-discharged 2/9. rt shoulder and back pain, coccydynia. Referred for LBP, also now R shoulder and neck pain after fall. 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 10/10 at worst, 9/10 at best. No weakness or numbness, no b/b incontinence. Has tried PT and TENS without benefit, Oyxcodone caused nausea, Hydrocodone 10mg helps, failed lower doses and Tramadol, OTC NSAIDs and tylenol, OTC creams. CT C-spine shows multilevel DDD and DJD, stenosis, no acute injury. Started Norco 10/325mg BID prn with benefit, some days could use 3, could not fill compounded cream due to cost, started Pennsaid with excellent benefit, Pennsaid denied, ordered diclofenac solution instead. Was taking Norco 10/325mg 2-3x/day with good benefit overall until he tore mesh from an umbilical hernia repair, will likely have surgery soon, then QID for hernia pain and working 60 hours/week. Became tolerant of Norco, rotated to Percocet 7.5/325mg QID which is less effective, then 10/325mg QID. Normally effective, but chest still healing from CABG, went to ED, dz with broken wire, now all broken, repaired with sutures, broke, needs plate in Feb 2018. New L RTC injury, needs Ortho, MRI pending, pain too severe at night. Taking Percocet 10mg QID prn. Hard to sleep with neck pain. L knee pain worsening, failed steroid injection with only 2-3 days relief, asking about gel injections.  Back Pain  Pertinent negatives include no abdominal pain, bladder incontinence, chest pain, fever, numbness or weakness.        The following portions of the patient's history were reviewed and updated as appropriate: allergies, current medications, past family history, past medical history, past social history, past surgical history and  problem list.    Review of Systems   Constitutional:  Negative for chills, fatigue and fever.   HENT:  Positive for hearing loss. Negative for trouble swallowing.    Eyes:  Negative for visual disturbance.   Respiratory:  Negative for shortness of breath.    Cardiovascular:  Negative for chest pain.   Gastrointestinal:  Negative for abdominal pain, constipation, diarrhea, nausea and vomiting.   Genitourinary:  Negative for urinary incontinence.   Musculoskeletal:  Positive for arthralgias, back pain and neck pain. Negative for joint swelling and myalgias.   Neurological:  Negative for dizziness, weakness, numbness and headache.       Objective   Physical Exam   Constitutional: He is oriented to person, place, and time. He appears well-developed and well-nourished.   HENT:   Head: Normocephalic and atraumatic.   Eyes: Pupils are equal, round, and reactive to light.   Cardiovascular: Normal rate, regular rhythm and normal heart sounds.   Pulmonary/Chest: Breath sounds normal.   Abdominal: Soft. Bowel sounds are normal. He exhibits no distension. There is no abdominal tenderness.   Musculoskeletal:      Comments: Wearing b/l CTS braces   Neurological: He is alert and oriented to person, place, and time. He has normal reflexes. He displays normal reflexes. No sensory deficit.   Psychiatric: His behavior is normal. Thought content normal.         Assessment/Plan   Diagnoses and all orders for this visit:    1. Chronic bilateral low back pain without sciatica (Primary)    2. Chronic pain of both knees    3. Degeneration of intervertebral disc of cervical region    4. Degeneration of intervertebral disc of lumbar region    5. Neck pain    6. Other long term (current) drug therapy    7. Primary osteoarthritis of both knees    8. Pain of cervical facet joint    9. Rotator cuff syndrome, left    10. Chronic left shoulder pain    11. Chronic right shoulder pain    12. Spinal stenosis of cervical region    13. Chest wall  pain        Inspect reviewed, in order. Low risk. Repeat UDS 8/2/22 (+) for small amount of THC c/w use of CBD products, repeat 7/28/23 in order.  Cont Percocet 10/325mg #150, filled 3/13/24. Given his highly varible pain level tied to activity and injury, plan to keep on short-acting opioids to allow him to minimize opioid use as tolerated. Added Oxycodone ER 20mg qHS, filled 7/23/21, doing better, will use sparingly in future.  Increased to Tizanidine 8mg qHS, #60, prn muscle pain, insomnia.  Cont other meds as prescribed.  Referral to Ortho for L RTC tear. Had L subacromial injection.  Did not have L knee Monovisc injection, failed steroid injection in past with only 2-3 days relief.  Cont b/l CTS braces.  RTC in 3 months for f/u.

## 2024-06-12 ENCOUNTER — TELEPHONE (OUTPATIENT)
Dept: PAIN MEDICINE | Facility: CLINIC | Age: 64
End: 2024-06-12
Payer: MEDICARE

## 2024-06-12 NOTE — TELEPHONE ENCOUNTER
Hub staff attempted to follow warm transfer process and was unsuccessful     Caller: BRIGITTE ESPITIA    Relationship to patient: SPOUSE    Best call back number: [phone]    Caller is needing: PATIENT'S SPOUSE, BRIGITTE WAS CALLING TO DISCUSS HER 'S PRESCRIPTION FOR OXYCODONE-ACETAMINOPHEN 10 MG NEEDING PRIOR AUTHORIZATION. BRIGITTE WAS CALLING TO FIND OUT IF YOUR OFFICE HAS RECEIVED A PAPER FROM [facility] IN [facility] REGARDING PATIENT'S PRIOR AUTHORIZATION. BRIGITTE WOULD LIKE A CALL BACK TO DISCUSS THIS ASAP. THANK YOU!

## 2024-07-09 ENCOUNTER — OFFICE VISIT (OUTPATIENT)
Dept: PAIN MEDICINE | Facility: CLINIC | Age: 64
End: 2024-07-09
Payer: MEDICARE

## 2024-07-09 VITALS
DIASTOLIC BLOOD PRESSURE: 87 MMHG | RESPIRATION RATE: 16 BRPM | HEART RATE: 72 BPM | WEIGHT: 222 LBS | OXYGEN SATURATION: 96 % | SYSTOLIC BLOOD PRESSURE: 151 MMHG | BODY MASS INDEX: 28.49 KG/M2

## 2024-07-09 DIAGNOSIS — M25.561 CHRONIC PAIN OF BOTH KNEES: ICD-10-CM

## 2024-07-09 DIAGNOSIS — M54.50 CHRONIC MIDLINE LOW BACK PAIN, UNSPECIFIED WHETHER SCIATICA PRESENT: ICD-10-CM

## 2024-07-09 DIAGNOSIS — M51.36 DEGENERATION OF INTERVERTEBRAL DISC OF LUMBAR REGION: ICD-10-CM

## 2024-07-09 DIAGNOSIS — M50.30 DEGENERATION OF INTERVERTEBRAL DISC OF CERVICAL REGION: ICD-10-CM

## 2024-07-09 DIAGNOSIS — G89.29 CHRONIC MIDLINE LOW BACK PAIN, UNSPECIFIED WHETHER SCIATICA PRESENT: ICD-10-CM

## 2024-07-09 DIAGNOSIS — Z79.899 OTHER LONG TERM (CURRENT) DRUG THERAPY: ICD-10-CM

## 2024-07-09 DIAGNOSIS — M48.02 SPINAL STENOSIS OF CERVICAL REGION: ICD-10-CM

## 2024-07-09 DIAGNOSIS — G89.29 CHRONIC BILATERAL LOW BACK PAIN WITHOUT SCIATICA: Primary | ICD-10-CM

## 2024-07-09 DIAGNOSIS — M25.562 CHRONIC PAIN OF BOTH KNEES: ICD-10-CM

## 2024-07-09 DIAGNOSIS — G89.29 CHRONIC LEFT SHOULDER PAIN: ICD-10-CM

## 2024-07-09 DIAGNOSIS — M25.511 CHRONIC RIGHT SHOULDER PAIN: ICD-10-CM

## 2024-07-09 DIAGNOSIS — R07.89 CHEST WALL PAIN: ICD-10-CM

## 2024-07-09 DIAGNOSIS — M25.512 CHRONIC LEFT SHOULDER PAIN: ICD-10-CM

## 2024-07-09 DIAGNOSIS — M67.912 ROTATOR CUFF DISORDER, LEFT: ICD-10-CM

## 2024-07-09 DIAGNOSIS — M54.2 NECK PAIN: ICD-10-CM

## 2024-07-09 DIAGNOSIS — G89.29 CHRONIC PAIN OF BOTH KNEES: ICD-10-CM

## 2024-07-09 DIAGNOSIS — M17.0 PRIMARY OSTEOARTHRITIS OF BOTH KNEES: ICD-10-CM

## 2024-07-09 DIAGNOSIS — G89.29 CHRONIC RIGHT SHOULDER PAIN: ICD-10-CM

## 2024-07-09 DIAGNOSIS — M54.2 PAIN OF CERVICAL FACET JOINT: ICD-10-CM

## 2024-07-09 DIAGNOSIS — Z79.899 HIGH RISK MEDICATION USE: Primary | ICD-10-CM

## 2024-07-09 DIAGNOSIS — M54.50 CHRONIC BILATERAL LOW BACK PAIN WITHOUT SCIATICA: Primary | ICD-10-CM

## 2024-07-09 PROCEDURE — 1159F MED LIST DOCD IN RCRD: CPT | Performed by: PHYSICAL MEDICINE & REHABILITATION

## 2024-07-09 PROCEDURE — 1160F RVW MEDS BY RX/DR IN RCRD: CPT | Performed by: PHYSICAL MEDICINE & REHABILITATION

## 2024-07-09 PROCEDURE — 99214 OFFICE O/P EST MOD 30 MIN: CPT | Performed by: PHYSICAL MEDICINE & REHABILITATION

## 2024-07-09 PROCEDURE — 1125F AMNT PAIN NOTED PAIN PRSNT: CPT | Performed by: PHYSICAL MEDICINE & REHABILITATION

## 2024-07-09 RX ORDER — OXYCODONE AND ACETAMINOPHEN 10; 325 MG/1; MG/1
1 TABLET ORAL
Qty: 150 TABLET | Refills: 0 | Status: SHIPPED | OUTPATIENT
Start: 2024-07-09

## 2024-07-09 RX ORDER — DICLOFENAC SODIUM 75 MG/1
1 TABLET, DELAYED RELEASE ORAL EVERY 12 HOURS SCHEDULED
COMMUNITY
Start: 2024-06-24

## 2024-07-09 NOTE — PROGRESS NOTES
Subjective   Christopher Nuñez is a 63 y.o. male.     History of Present Illness  mid to lower back tail bone pain, recent bypass 2/6-discharged 2/9. rt shoulder and back pain, coccydynia. Referred for LBP, also now R shoulder and neck pain after fall. 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 10/10 at worst, 9/10 at best. No weakness or numbness, no b/b incontinence. Has tried PT and TENS without benefit, Oyxcodone caused nausea, Hydrocodone 10mg helps, failed lower doses and Tramadol, OTC NSAIDs and tylenol, OTC creams. CT C-spine shows multilevel DDD and DJD, stenosis, no acute injury. Started Norco 10/325mg BID prn with benefit, some days could use 3, could not fill compounded cream due to cost, started Pennsaid with excellent benefit, Pennsaid denied, ordered diclofenac solution instead. Was taking Norco 10/325mg 2-3x/day with good benefit overall until he tore mesh from an umbilical hernia repair, will likely have surgery soon, then QID for hernia pain and working 60 hours/week. Became tolerant of Norco, rotated to Percocet 7.5/325mg QID which is less effective, then 10/325mg QID. Normally effective, but chest still healing from CABG, went to ED, dz with broken wire, now all broken, repaired with sutures, broke, needs plate in Feb 2018. New L RTC injury, needs Ortho, MRI pending, pain too severe at night. Taking Percocet 10mg QID prn. Hard to sleep with neck pain. L knee pain worsening, failed steroid injection with only 2-3 days relief, asking about gel injections.  Pain  Associated symptoms include arthralgias and neck pain. Pertinent negatives include no abdominal pain, chest pain, chills, fatigue, fever, joint swelling, myalgias, nausea, numbness, vomiting or weakness.   Back Pain  Pertinent negatives include no abdominal pain, bladder incontinence, chest pain, fever, numbness or weakness.         The following portions of the patient's history were reviewed and updated as appropriate: allergies, current medications, past family history, past medical history, past social history, past surgical history and problem list.    Review of Systems   Constitutional:  Negative for chills, fatigue and fever.   HENT:  Positive for hearing loss. Negative for trouble swallowing.    Eyes:  Negative for visual disturbance.   Respiratory:  Negative for shortness of breath.    Cardiovascular:  Negative for chest pain.   Gastrointestinal:  Negative for abdominal pain, constipation, diarrhea, nausea and vomiting.   Genitourinary:  Negative for urinary incontinence.   Musculoskeletal:  Positive for arthralgias, back pain and neck pain. Negative for joint swelling and myalgias.   Neurological:  Negative for dizziness, weakness, numbness and headache.       Objective   Physical Exam   Constitutional: He is oriented to person, place, and time. He appears well-developed and well-nourished.   HENT:   Head: Normocephalic and atraumatic.   Eyes: Pupils are equal, round, and reactive to light.   Cardiovascular: Normal rate, regular rhythm and normal heart sounds.   Pulmonary/Chest: Breath sounds normal.   Abdominal: Soft. Bowel sounds are normal. He exhibits no distension. There is no abdominal tenderness.   Musculoskeletal:      Comments: Wearing b/l CTS braces   Neurological: He is alert and oriented to person, place, and time. He has normal reflexes. He displays normal reflexes. No sensory deficit.   Psychiatric: His behavior is normal. Thought content normal.         Assessment/Plan   Diagnoses and all orders for this visit:    1. Chronic bilateral low back pain without sciatica (Primary)    2. Chest wall pain    3. Chronic left shoulder pain    4. Chronic pain of both knees    5. Degeneration of intervertebral disc of cervical region    6. Degeneration of intervertebral disc of lumbar region    7. Neck pain    8. Other long term  (current) drug therapy    9. Pain of cervical facet joint    10. Primary osteoarthritis of both knees    11. Rotator cuff disorder, left    12. Chronic right shoulder pain    13. Spinal stenosis of cervical region        Inspect reviewed, in order. Low risk. Repeat UDS 8/2/22 (+) for small amount of THC c/w use of CBD products, repeat 7/28/23 in order.  Cont Percocet 10/325mg #150, filled 6/12/24. Given his highly varible pain level tied to activity and injury, plan to keep on short-acting opioids to allow him to minimize opioid use as tolerated. Added Oxycodone ER 20mg qHS, filled 7/23/21, doing better, will use sparingly in future.  Patient's pain is still well-managed by current medication regimen, is doing well at this strength and dosage, therefore I will continue to prescribe unchanged as the most appropriate course of treatment.  Increased to Tizanidine 8mg qHS, #60, prn muscle pain, insomnia.  Cont other meds as prescribed.  Referral to Ortho for L RTC tear. Had L subacromial injection.  Did not have L knee Monovisc injection, failed steroid injection in past with only 2-3 days relief.  Cont b/l CTS braces. Refer to [facility] for surgical eval.  RTC in 3 months for f/u.

## 2024-09-23 ENCOUNTER — OFFICE VISIT (OUTPATIENT)
Dept: PAIN MEDICINE | Facility: CLINIC | Age: 64
End: 2024-09-23
Payer: MEDICARE

## 2024-09-23 VITALS
HEART RATE: 65 BPM | OXYGEN SATURATION: 97 % | SYSTOLIC BLOOD PRESSURE: 127 MMHG | DIASTOLIC BLOOD PRESSURE: 72 MMHG | RESPIRATION RATE: 16 BRPM

## 2024-09-23 DIAGNOSIS — M51.369 DEGENERATION OF INTERVERTEBRAL DISC OF LUMBAR REGION: ICD-10-CM

## 2024-09-23 DIAGNOSIS — M54.50 CHRONIC MIDLINE LOW BACK PAIN, UNSPECIFIED WHETHER SCIATICA PRESENT: ICD-10-CM

## 2024-09-23 DIAGNOSIS — M54.50 CHRONIC BILATERAL LOW BACK PAIN WITHOUT SCIATICA: Primary | ICD-10-CM

## 2024-09-23 DIAGNOSIS — M25.562 CHRONIC PAIN OF BOTH KNEES: ICD-10-CM

## 2024-09-23 DIAGNOSIS — M50.30 DEGENERATION OF INTERVERTEBRAL DISC OF CERVICAL REGION: ICD-10-CM

## 2024-09-23 DIAGNOSIS — M25.561 CHRONIC PAIN OF BOTH KNEES: ICD-10-CM

## 2024-09-23 DIAGNOSIS — M54.2 NECK PAIN: ICD-10-CM

## 2024-09-23 DIAGNOSIS — M54.2 PAIN OF CERVICAL FACET JOINT: ICD-10-CM

## 2024-09-23 DIAGNOSIS — G89.29 CHRONIC LEFT SHOULDER PAIN: ICD-10-CM

## 2024-09-23 DIAGNOSIS — M48.02 SPINAL STENOSIS OF CERVICAL REGION: ICD-10-CM

## 2024-09-23 DIAGNOSIS — M25.512 CHRONIC LEFT SHOULDER PAIN: ICD-10-CM

## 2024-09-23 DIAGNOSIS — M25.511 CHRONIC RIGHT SHOULDER PAIN: ICD-10-CM

## 2024-09-23 DIAGNOSIS — G89.29 CHRONIC PAIN OF BOTH KNEES: ICD-10-CM

## 2024-09-23 DIAGNOSIS — M17.0 PRIMARY OSTEOARTHRITIS OF BOTH KNEES: ICD-10-CM

## 2024-09-23 DIAGNOSIS — G89.29 CHRONIC BILATERAL LOW BACK PAIN WITHOUT SCIATICA: Primary | ICD-10-CM

## 2024-09-23 DIAGNOSIS — M67.912 ROTATOR CUFF DISORDER, LEFT: ICD-10-CM

## 2024-09-23 DIAGNOSIS — G89.29 CHRONIC RIGHT SHOULDER PAIN: ICD-10-CM

## 2024-09-23 DIAGNOSIS — G89.29 CHRONIC MIDLINE LOW BACK PAIN, UNSPECIFIED WHETHER SCIATICA PRESENT: ICD-10-CM

## 2024-09-23 DIAGNOSIS — Z79.899 OTHER LONG TERM (CURRENT) DRUG THERAPY: ICD-10-CM

## 2024-09-23 PROCEDURE — 99214 OFFICE O/P EST MOD 30 MIN: CPT | Performed by: PHYSICAL MEDICINE & REHABILITATION

## 2024-09-23 PROCEDURE — 1159F MED LIST DOCD IN RCRD: CPT | Performed by: PHYSICAL MEDICINE & REHABILITATION

## 2024-09-23 PROCEDURE — 1160F RVW MEDS BY RX/DR IN RCRD: CPT | Performed by: PHYSICAL MEDICINE & REHABILITATION

## 2024-09-23 PROCEDURE — G0463 HOSPITAL OUTPT CLINIC VISIT: HCPCS | Performed by: PHYSICAL MEDICINE & REHABILITATION

## 2024-09-23 PROCEDURE — 1125F AMNT PAIN NOTED PAIN PRSNT: CPT | Performed by: PHYSICAL MEDICINE & REHABILITATION

## 2024-09-23 RX ORDER — OXYCODONE AND ACETAMINOPHEN 10; 325 MG/1; MG/1
1 TABLET ORAL
Qty: 150 TABLET | Refills: 0 | Status: SHIPPED | OUTPATIENT
Start: 2024-09-23

## 2024-11-08 ENCOUNTER — TELEPHONE (OUTPATIENT)
Dept: PAIN MEDICINE | Facility: CLINIC | Age: 64
End: 2024-11-08
Payer: MEDICARE

## 2024-11-08 NOTE — TELEPHONE ENCOUNTER
Patient advised medication for Dec is already at the pharmacy and he will discuss injections at next appt.

## 2024-11-08 NOTE — TELEPHONE ENCOUNTER
Caller: Anai Nuñez    Relationship: Emergency Contact    Best call back number:     Requested Prescriptions:   oxyCODONE-acetaminophen (Percocet)  MG per tablet     Pharmacy where request should be sent:  [facility] [id number] [place] [id number] [address] - [phone]  - [phone]  [phone]     Last office visit with prescribing clinician: 9/23/2024   Last telemedicine visit with prescribing clinician: Visit date not found   Next office visit with prescribing clinician: 12/17/2024     Additional details provided by patient: PATIENT WILL RUN OUT OF MEDICATION AROUND 7TH OR 8TH OF DECEMBER.  PATIENT WOULD LIKE TO DO CORTISONE INJECTIONS IN THE WRIST AS WELL      Does the patient have less than a 3 day supply:  [] Yes  [] No    Would you like a call back once the refill request has been completed: [] Yes [] No    If the office needs to give you a call back, can they leave a voicemail: [] Yes [] No    Rudi Galeana Rep   11/08/24 09:22 EST

## 2024-12-17 ENCOUNTER — OFFICE VISIT (OUTPATIENT)
Dept: PAIN MEDICINE | Facility: CLINIC | Age: 64
End: 2024-12-17
Payer: MEDICARE

## 2024-12-17 VITALS
DIASTOLIC BLOOD PRESSURE: 81 MMHG | OXYGEN SATURATION: 97 % | WEIGHT: 229.5 LBS | HEART RATE: 71 BPM | BODY MASS INDEX: 29.45 KG/M2 | SYSTOLIC BLOOD PRESSURE: 148 MMHG | RESPIRATION RATE: 16 BRPM

## 2024-12-17 DIAGNOSIS — G89.29 CHRONIC MIDLINE LOW BACK PAIN, UNSPECIFIED WHETHER SCIATICA PRESENT: ICD-10-CM

## 2024-12-17 DIAGNOSIS — M25.562 CHRONIC PAIN OF BOTH KNEES: ICD-10-CM

## 2024-12-17 DIAGNOSIS — M54.2 PAIN OF CERVICAL FACET JOINT: ICD-10-CM

## 2024-12-17 DIAGNOSIS — M25.512 CHRONIC LEFT SHOULDER PAIN: ICD-10-CM

## 2024-12-17 DIAGNOSIS — M51.360 DEGENERATION OF INTERVERTEBRAL DISC OF LUMBAR REGION WITH DISCOGENIC BACK PAIN: ICD-10-CM

## 2024-12-17 DIAGNOSIS — M50.30 DEGENERATION OF INTERVERTEBRAL DISC OF CERVICAL REGION: ICD-10-CM

## 2024-12-17 DIAGNOSIS — M54.50 CHRONIC MIDLINE LOW BACK PAIN, UNSPECIFIED WHETHER SCIATICA PRESENT: ICD-10-CM

## 2024-12-17 DIAGNOSIS — M25.511 CHRONIC RIGHT SHOULDER PAIN: ICD-10-CM

## 2024-12-17 DIAGNOSIS — M67.912 ROTATOR CUFF DISORDER, LEFT: ICD-10-CM

## 2024-12-17 DIAGNOSIS — M48.02 SPINAL STENOSIS OF CERVICAL REGION: ICD-10-CM

## 2024-12-17 DIAGNOSIS — G89.29 CHRONIC LEFT SHOULDER PAIN: ICD-10-CM

## 2024-12-17 DIAGNOSIS — M17.0 PRIMARY OSTEOARTHRITIS OF BOTH KNEES: ICD-10-CM

## 2024-12-17 DIAGNOSIS — M25.531 RIGHT WRIST PAIN: ICD-10-CM

## 2024-12-17 DIAGNOSIS — Z79.899 OTHER LONG TERM (CURRENT) DRUG THERAPY: ICD-10-CM

## 2024-12-17 DIAGNOSIS — G89.29 CHRONIC BILATERAL LOW BACK PAIN WITHOUT SCIATICA: Primary | ICD-10-CM

## 2024-12-17 DIAGNOSIS — G89.29 CHRONIC RIGHT SHOULDER PAIN: ICD-10-CM

## 2024-12-17 DIAGNOSIS — M54.2 NECK PAIN: ICD-10-CM

## 2024-12-17 DIAGNOSIS — M54.50 CHRONIC BILATERAL LOW BACK PAIN WITHOUT SCIATICA: Primary | ICD-10-CM

## 2024-12-17 DIAGNOSIS — G89.29 CHRONIC PAIN OF BOTH KNEES: ICD-10-CM

## 2024-12-17 DIAGNOSIS — M25.561 CHRONIC PAIN OF BOTH KNEES: ICD-10-CM

## 2024-12-17 DIAGNOSIS — M19.031 LOCALIZED PRIMARY OSTEOARTHRITIS OF RIGHT WRIST: ICD-10-CM

## 2024-12-17 DIAGNOSIS — R07.89 CHEST WALL PAIN: ICD-10-CM

## 2024-12-17 PROCEDURE — 1159F MED LIST DOCD IN RCRD: CPT | Performed by: PHYSICAL MEDICINE & REHABILITATION

## 2024-12-17 PROCEDURE — 99214 OFFICE O/P EST MOD 30 MIN: CPT | Performed by: PHYSICAL MEDICINE & REHABILITATION

## 2024-12-17 PROCEDURE — 1160F RVW MEDS BY RX/DR IN RCRD: CPT | Performed by: PHYSICAL MEDICINE & REHABILITATION

## 2024-12-17 PROCEDURE — 1125F AMNT PAIN NOTED PAIN PRSNT: CPT | Performed by: PHYSICAL MEDICINE & REHABILITATION

## 2024-12-17 RX ORDER — OXYCODONE AND ACETAMINOPHEN 10; 325 MG/1; MG/1
1 TABLET ORAL
Qty: 150 TABLET | Refills: 0 | Status: SHIPPED | OUTPATIENT
Start: 2024-12-17

## 2024-12-17 NOTE — PROGRESS NOTES
Subjective   Christopher Nuñez is a 63 y.o. male.     History of Present Illness  mid to lower back tail bone pain, recent bypass 2/6-discharged 2/9. rt shoulder and back pain, coccydynia. Referred for LBP, also now R shoulder and neck pain after fall. 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 10/10 at worst, 9/10 at best. No weakness or numbness, no b/b incontinence. Has tried PT and TENS without benefit, Oyxcodone caused nausea, Hydrocodone 10mg helps, failed lower doses and Tramadol, OTC NSAIDs and tylenol, OTC creams. CT C-spine shows multilevel DDD and DJD, stenosis, no acute injury. Started Norco 10/325mg BID prn with benefit, some days could use 3, could not fill compounded cream due to cost, started Pennsaid with excellent benefit, Pennsaid denied, ordered diclofenac solution instead. Was taking Norco 10/325mg 2-3x/day with good benefit overall until he tore mesh from an umbilical hernia repair, will likely have surgery soon, then QID for hernia pain and working 60 hours/week. Became tolerant of Norco, rotated to Percocet 7.5/325mg QID which is less effective, then 10/325mg QID. Normally effective, but chest still healing from CABG, went to ED, dx with broken wire, now all broken, repaired with sutures, broke, needs plate in Feb 2018. New L RTC injury, needs Ortho, MRI pending, pain too severe at night. Taking Percocet 10mg QID prn. Hard to sleep with neck pain. L knee pain worsening, failed steroid injection with only 2-3 days relief, asking about gel injections.  Back Pain  Pertinent negatives include no abdominal pain, bladder incontinence, chest pain, fever, numbness or weakness.   Pain  Associated symptoms include arthralgias and neck pain. Pertinent negatives include no abdominal pain, chest pain, chills, fatigue, fever, joint swelling, myalgias, nausea, numbness, vomiting or weakness.         The following portions of the patient's history were reviewed and updated as appropriate: allergies, current medications, past family history, past medical history, past social history, past surgical history and problem list.    Review of Systems   Constitutional:  Negative for chills, fatigue and fever.   HENT:  Positive for hearing loss. Negative for trouble swallowing.    Eyes:  Negative for visual disturbance.   Respiratory:  Negative for shortness of breath.    Cardiovascular:  Negative for chest pain.   Gastrointestinal:  Negative for abdominal pain, constipation, diarrhea, nausea and vomiting.   Genitourinary:  Negative for urinary incontinence.   Musculoskeletal:  Positive for arthralgias, back pain and neck pain. Negative for joint swelling and myalgias.   Neurological:  Negative for dizziness, weakness, numbness and headache.       Objective   Physical Exam   Constitutional: He is oriented to person, place, and time. He appears well-developed and well-nourished.   HENT:   Head: Normocephalic and atraumatic.   Eyes: Pupils are equal, round, and reactive to light.   Cardiovascular: Normal rate, regular rhythm and normal heart sounds.   Pulmonary/Chest: Breath sounds normal.   Abdominal: Soft. Bowel sounds are normal. He exhibits no distension. There is no abdominal tenderness.   Musculoskeletal:      Comments: Wearing b/l CTS braces   Neurological: He is alert and oriented to person, place, and time. He has normal reflexes. He displays normal reflexes. No sensory deficit.   Psychiatric: His behavior is normal. Thought content normal.         Assessment/Plan   Diagnoses and all orders for this visit:    1. Chronic bilateral low back pain without sciatica (Primary)    2. Chest wall pain    3. Chronic left shoulder pain    4. Chronic pain of both knees    5. Degeneration of intervertebral disc of cervical region    6. Degeneration of intervertebral disc of lumbar region with discogenic back pain    7. Neck  pain    8. Other long term (current) drug therapy    9. Pain of cervical facet joint    10. Primary osteoarthritis of both knees    11. Rotator cuff disorder, left    12. Chronic right shoulder pain    13. Spinal stenosis of cervical region        Inspect reviewed, in order. Low risk. Repeat UDS 8/2/22 (+) for small amount of THC c/w use of CBD products, repeat 7/28/23, 7/10/24 in order.  Cont Percocet 10/325mg #150, filled 12/7/24. Given his highly varible pain level tied to activity and injury, plan to keep on short-acting opioids to allow him to minimize opioid use as tolerated.   Added Oxycodone ER 20mg qHS, filled 7/23/21, doing better, will use sparingly in future.  Patient's pain is still well-managed by current medication regimen, is doing well at this strength and dosage, therefore I will continue to prescribe unchanged as the most appropriate course of treatment.  Increased to Tizanidine 8mg qHS, #60, prn muscle pain, insomnia.  Cont other meds as prescribed.  Referral to Ortho for L RTC tear. Had L subacromial injection.  Did not have L knee Monovisc injection, failed steroid injection in past with only 2-3 days relief.  Schedule radial right wrist injection, had nearly 100% relief with prior injection with [facility].  Cont b/l CTS braces. Refer to [facility] for surgical eval.  RTC  for R wrist inj then in 3 months for f/u.

## 2025-01-02 ENCOUNTER — TELEPHONE (OUTPATIENT)
Dept: PAIN MEDICINE | Facility: CLINIC | Age: 65
End: 2025-01-02
Payer: MEDICARE

## 2025-01-02 NOTE — TELEPHONE ENCOUNTER
Hub staff attempted to follow warm transfer process and was unsuccessful     Caller: Anai Nuñez    Relationship to patient: Emergency Contact    Best call back number: [phone]     Patient is needing: PATIENT NEEDS TO R/S HIS INJECTION FOR TODAY, HE ISN'T FEELING WELL

## 2025-01-31 ENCOUNTER — TELEPHONE (OUTPATIENT)
Dept: PAIN MEDICINE | Facility: CLINIC | Age: 65
End: 2025-01-31
Payer: MEDICARE

## 2025-01-31 NOTE — TELEPHONE ENCOUNTER
Caller: Anai Nuñez    Relationship: Emergency Contact    Best call back number: [phone]    Requested Prescriptions:   OXYCODONE     Pharmacy where request should be sent: [facility] [address] [place], [place] - [address] [facility] [place] [phone]  - [phone] FX     Last office visit with prescribing clinician: 12/17/2024   Last telemedicine visit with prescribing clinician: Visit date not found   Next office visit with prescribing clinician: 3/18/2025     Does the patient have less than a 3 day supply:  [] Yes  [x] No    Would you like a call back once the refill request has been completed: [x] Yes [] No    If the office needs to give you a call back, can they leave a voicemail: [x] Yes [] No    Jose D Eastman   01/31/25 14:49 EST

## 2025-03-18 ENCOUNTER — OFFICE VISIT (OUTPATIENT)
Dept: PAIN MEDICINE | Facility: CLINIC | Age: 65
End: 2025-03-18
Payer: MEDICARE

## 2025-03-18 VITALS
SYSTOLIC BLOOD PRESSURE: 155 MMHG | DIASTOLIC BLOOD PRESSURE: 97 MMHG | RESPIRATION RATE: 16 BRPM | HEART RATE: 72 BPM | OXYGEN SATURATION: 97 % | WEIGHT: 222.7 LBS | BODY MASS INDEX: 28.58 KG/M2

## 2025-03-18 DIAGNOSIS — M54.50 CHRONIC BILATERAL LOW BACK PAIN WITHOUT SCIATICA: Primary | ICD-10-CM

## 2025-03-18 DIAGNOSIS — M54.50 CHRONIC MIDLINE LOW BACK PAIN, UNSPECIFIED WHETHER SCIATICA PRESENT: ICD-10-CM

## 2025-03-18 DIAGNOSIS — G89.29 CHRONIC LEFT SHOULDER PAIN: ICD-10-CM

## 2025-03-18 DIAGNOSIS — M50.30 DEGENERATION OF INTERVERTEBRAL DISC OF CERVICAL REGION: ICD-10-CM

## 2025-03-18 DIAGNOSIS — Z79.899 OTHER LONG TERM (CURRENT) DRUG THERAPY: ICD-10-CM

## 2025-03-18 DIAGNOSIS — M54.2 PAIN OF CERVICAL FACET JOINT: ICD-10-CM

## 2025-03-18 DIAGNOSIS — G89.29 CHRONIC PAIN OF BOTH KNEES: ICD-10-CM

## 2025-03-18 DIAGNOSIS — M48.02 SPINAL STENOSIS OF CERVICAL REGION: ICD-10-CM

## 2025-03-18 DIAGNOSIS — M54.2 NECK PAIN: ICD-10-CM

## 2025-03-18 DIAGNOSIS — G89.29 CHRONIC MIDLINE LOW BACK PAIN, UNSPECIFIED WHETHER SCIATICA PRESENT: ICD-10-CM

## 2025-03-18 DIAGNOSIS — M25.562 CHRONIC PAIN OF BOTH KNEES: ICD-10-CM

## 2025-03-18 DIAGNOSIS — M25.531 RIGHT WRIST PAIN: ICD-10-CM

## 2025-03-18 DIAGNOSIS — M25.512 CHRONIC LEFT SHOULDER PAIN: ICD-10-CM

## 2025-03-18 DIAGNOSIS — M51.360 DEGENERATION OF INTERVERTEBRAL DISC OF LUMBAR REGION WITH DISCOGENIC BACK PAIN: ICD-10-CM

## 2025-03-18 DIAGNOSIS — M67.912 ROTATOR CUFF DISORDER, LEFT: ICD-10-CM

## 2025-03-18 DIAGNOSIS — M25.561 CHRONIC PAIN OF BOTH KNEES: ICD-10-CM

## 2025-03-18 DIAGNOSIS — R07.89 CHEST WALL PAIN: ICD-10-CM

## 2025-03-18 DIAGNOSIS — G89.29 CHRONIC BILATERAL LOW BACK PAIN WITHOUT SCIATICA: Primary | ICD-10-CM

## 2025-03-18 PROCEDURE — 1160F RVW MEDS BY RX/DR IN RCRD: CPT | Performed by: PHYSICAL MEDICINE & REHABILITATION

## 2025-03-18 PROCEDURE — 1125F AMNT PAIN NOTED PAIN PRSNT: CPT | Performed by: PHYSICAL MEDICINE & REHABILITATION

## 2025-03-18 PROCEDURE — 1159F MED LIST DOCD IN RCRD: CPT | Performed by: PHYSICAL MEDICINE & REHABILITATION

## 2025-03-18 PROCEDURE — 99214 OFFICE O/P EST MOD 30 MIN: CPT | Performed by: PHYSICAL MEDICINE & REHABILITATION

## 2025-03-18 PROCEDURE — G2211 COMPLEX E/M VISIT ADD ON: HCPCS | Performed by: PHYSICAL MEDICINE & REHABILITATION

## 2025-03-18 RX ORDER — PRASUGREL 10 MG/1
1 TABLET, FILM COATED ORAL DAILY
COMMUNITY
Start: 2025-01-02

## 2025-03-18 RX ORDER — OXYCODONE AND ACETAMINOPHEN 10; 325 MG/1; MG/1
1 TABLET ORAL
Qty: 150 TABLET | Refills: 0 | Status: SHIPPED | OUTPATIENT
Start: 2025-03-18

## 2025-03-18 NOTE — PROGRESS NOTES
Subjective   Christopher Nuñez is a 64 y.o. male.     History of Present Illness  CC: low back pain    mid to lower back tail bone pain, recent bypass 2/6-discharged 2/9. rt shoulder and back pain, coccydynia. Referred for LBP, also now R shoulder and neck pain after fall. 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 10/10 at worst, 9/10 at best. No weakness or numbness, no b/b incontinence. Has tried PT and TENS without benefit, Oyxcodone caused nausea, Hydrocodone 10mg helps, failed lower doses and Tramadol, OTC NSAIDs and tylenol, OTC creams. CT C-spine shows multilevel DDD and DJD, stenosis, no acute injury. Started Norco 10/325mg BID prn with benefit, some days could use 3, could not fill compounded cream due to cost, started Pennsaid with excellent benefit, Pennsaid denied, ordered diclofenac solution instead. Was taking Norco 10/325mg 2-3x/day with good benefit overall until he tore mesh from an umbilical hernia repair, will likely have surgery soon, then QID for hernia pain and working 60 hours/week. Became tolerant of Norco, rotated to Percocet 7.5/325mg QID which is less effective, then 10/325mg QID. Normally effective, but chest still healing from CABG, went to ED, dx with broken wire, now all broken, repaired with sutures, broke, needs plate in Feb 2018. New L RTC injury, needs Ortho, MRI pending, pain too severe at night. Taking Percocet 10mg QID prn. Hard to sleep with neck pain. L knee pain worsening, failed steroid injection with only 2-3 days relief, asking about gel injections.  Back Pain  Pertinent negatives include no abdominal pain, bladder incontinence, chest pain, fever, numbness or weakness.   Pain  Associated symptoms include arthralgias and neck pain. Pertinent negatives include no abdominal pain, chest pain, chills, fatigue, fever, joint swelling, myalgias,  nausea, numbness, vomiting or weakness.        The following portions of the patient's history were reviewed and updated as appropriate: allergies, current medications, past family history, past medical history, past social history, past surgical history and problem list.    Review of Systems   Constitutional:  Negative for chills, fatigue and fever.   HENT:  Positive for hearing loss. Negative for trouble swallowing.    Eyes:  Negative for visual disturbance.   Respiratory:  Negative for shortness of breath.    Cardiovascular:  Negative for chest pain.   Gastrointestinal:  Negative for abdominal pain, constipation, diarrhea, nausea and vomiting.   Genitourinary:  Negative for urinary incontinence.   Musculoskeletal:  Positive for arthralgias, back pain and neck pain. Negative for joint swelling and myalgias.   Neurological:  Negative for dizziness, weakness, numbness and headache.       Objective   Physical Exam   Constitutional: He is oriented to person, place, and time. He appears well-developed and well-nourished.   HENT:   Head: Normocephalic and atraumatic.   Eyes: Pupils are equal, round, and reactive to light.   Cardiovascular: Normal rate, regular rhythm and normal heart sounds.   Pulmonary/Chest: Breath sounds normal.   Abdominal: Soft. Bowel sounds are normal. He exhibits no distension. There is no abdominal tenderness.   Musculoskeletal:      Comments: Wearing b/l CTS braces   Neurological: He is alert and oriented to person, place, and time. He has normal reflexes. He displays normal reflexes. No sensory deficit.   Psychiatric: His behavior is normal. Thought content normal.         Assessment/Plan   Diagnoses and all orders for this visit:    1. Chronic bilateral low back pain without sciatica (Primary)    2. Chronic left shoulder pain    3. Chest wall pain    4. Chronic pain of both knees    5. Degeneration of intervertebral disc of cervical region    6. Degeneration of intervertebral disc of lumbar  region with discogenic back pain    7. Neck pain    8. Other long term (current) drug therapy    9. Pain of cervical facet joint    10. Right wrist pain    11. Rotator cuff disorder, left    12. Spinal stenosis of cervical region        Inspect reviewed, in order. Low risk. Repeat UDS 8/2/22 (+) for small amount of THC c/w use of CBD products, repeat 7/28/23, 7/10/24 in order.  Cont Percocet 10/325mg #150, filled 3/8/25. Given his highly varible pain level tied to activity and injury, plan to keep on short-acting opioids to allow him to minimize opioid use as tolerated.   Added Oxycodone ER 20mg qHS, filled 7/23/21, doing better, will use sparingly in future.  Patient's pain is still well-managed by current medication regimen, is doing well at this strength and dosage, therefore I will continue to prescribe unchanged as the most appropriate course of treatment.  Increased to Tizanidine 8mg qHS, #60, prn muscle pain, insomnia.  Cont other meds as prescribed.  Referral to Ortho for L RTC tear. Had L subacromial injection.  Did not have L knee Monovisc injection, failed steroid injection in past with only 2-3 days relief.  Schedule radial right wrist injection, had nearly 100% relief with prior injection with [facility].  Cont b/l CTS braces. Refer to [facility] for surgical eval.  RTC  for R wrist inj then in 3 months for f/u.                         Physical Exam  Constitutional:       Appearance: He is well-developed.   HENT:      Head: Normocephalic and atraumatic.   Eyes:      Pupils: Pupils are equal, round, and reactive to light.   Cardiovascular:      Rate and Rhythm: Normal rate and regular rhythm.      Heart sounds: Normal heart sounds.   Pulmonary:      Breath sounds: Normal breath sounds.   Abdominal:      General: Bowel sounds are normal. There is no distension.      Palpations: Abdomen is soft.      Tenderness: There is no abdominal tenderness.   Musculoskeletal:      Comments: Wearing b/l CTS braces   Neurological:       Mental Status: He is alert and oriented to person, place, and time.      Sensory: No sensory deficit.      Deep Tendon Reflexes: Reflexes are normal and symmetric. Reflexes normal.   Psychiatric:         Behavior: Behavior normal.         Thought Content: Thought content normal.

## 2025-06-03 ENCOUNTER — TELEPHONE (OUTPATIENT)
Dept: PAIN MEDICINE | Facility: CLINIC | Age: 65
End: 2025-06-03

## 2025-06-03 ENCOUNTER — OFFICE VISIT (OUTPATIENT)
Dept: PAIN MEDICINE | Facility: CLINIC | Age: 65
End: 2025-06-03
Payer: MEDICARE

## 2025-06-03 VITALS
HEART RATE: 84 BPM | DIASTOLIC BLOOD PRESSURE: 117 MMHG | RESPIRATION RATE: 16 BRPM | WEIGHT: 214.1 LBS | OXYGEN SATURATION: 99 % | SYSTOLIC BLOOD PRESSURE: 181 MMHG | BODY MASS INDEX: 27.48 KG/M2

## 2025-06-03 DIAGNOSIS — M25.512 CHRONIC LEFT SHOULDER PAIN: ICD-10-CM

## 2025-06-03 DIAGNOSIS — M25.561 CHRONIC PAIN OF BOTH KNEES: ICD-10-CM

## 2025-06-03 DIAGNOSIS — M17.0 PRIMARY OSTEOARTHRITIS OF BOTH KNEES: ICD-10-CM

## 2025-06-03 DIAGNOSIS — M54.2 NECK PAIN: ICD-10-CM

## 2025-06-03 DIAGNOSIS — M50.30 DEGENERATION OF INTERVERTEBRAL DISC OF CERVICAL REGION: ICD-10-CM

## 2025-06-03 DIAGNOSIS — M25.531 RIGHT WRIST PAIN: ICD-10-CM

## 2025-06-03 DIAGNOSIS — M54.2 PAIN OF CERVICAL FACET JOINT: ICD-10-CM

## 2025-06-03 DIAGNOSIS — G89.29 CHRONIC BILATERAL LOW BACK PAIN WITHOUT SCIATICA: Primary | ICD-10-CM

## 2025-06-03 DIAGNOSIS — M25.562 CHRONIC PAIN OF BOTH KNEES: ICD-10-CM

## 2025-06-03 DIAGNOSIS — M48.02 SPINAL STENOSIS OF CERVICAL REGION: ICD-10-CM

## 2025-06-03 DIAGNOSIS — G89.29 CHRONIC PAIN OF BOTH KNEES: ICD-10-CM

## 2025-06-03 DIAGNOSIS — Z79.899 OTHER LONG TERM (CURRENT) DRUG THERAPY: ICD-10-CM

## 2025-06-03 DIAGNOSIS — M54.50 CHRONIC BILATERAL LOW BACK PAIN WITHOUT SCIATICA: Primary | ICD-10-CM

## 2025-06-03 DIAGNOSIS — G89.29 CHRONIC RIGHT SHOULDER PAIN: ICD-10-CM

## 2025-06-03 DIAGNOSIS — M51.360 DEGENERATION OF INTERVERTEBRAL DISC OF LUMBAR REGION WITH DISCOGENIC BACK PAIN: ICD-10-CM

## 2025-06-03 DIAGNOSIS — M25.511 CHRONIC RIGHT SHOULDER PAIN: ICD-10-CM

## 2025-06-03 DIAGNOSIS — Z79.899 HIGH RISK MEDICATION USE: Primary | ICD-10-CM

## 2025-06-03 DIAGNOSIS — M67.912 ROTATOR CUFF DISORDER, LEFT: ICD-10-CM

## 2025-06-03 DIAGNOSIS — G89.29 CHRONIC LEFT SHOULDER PAIN: ICD-10-CM

## 2025-06-03 PROCEDURE — 99214 OFFICE O/P EST MOD 30 MIN: CPT | Performed by: PHYSICAL MEDICINE & REHABILITATION

## 2025-06-03 PROCEDURE — 1160F RVW MEDS BY RX/DR IN RCRD: CPT | Performed by: PHYSICAL MEDICINE & REHABILITATION

## 2025-06-03 PROCEDURE — 1159F MED LIST DOCD IN RCRD: CPT | Performed by: PHYSICAL MEDICINE & REHABILITATION

## 2025-06-03 PROCEDURE — G2211 COMPLEX E/M VISIT ADD ON: HCPCS | Performed by: PHYSICAL MEDICINE & REHABILITATION

## 2025-06-03 PROCEDURE — 1125F AMNT PAIN NOTED PAIN PRSNT: CPT | Performed by: PHYSICAL MEDICINE & REHABILITATION

## 2025-06-03 RX ORDER — HYDROCODONE BITARTRATE AND ACETAMINOPHEN 10; 325 MG/1; MG/1
1 TABLET ORAL EVERY 4 HOURS PRN
Qty: 180 TABLET | Refills: 0 | Status: SHIPPED | OUTPATIENT
Start: 2025-06-03

## 2025-06-03 NOTE — PROGRESS NOTES
Subjective   Christopher Nuñez is a 64 y.o. male.     History of Present Illness  CC: low back pain    mid to lower back tail bone pain. rt shoulder and back pain, coccydynia. Referred for LBP, also now R shoulder and neck pain after fall. 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 10/10 at worst, 9/10 at best. No weakness or numbness, no b/b incontinence. Has tried PT and TENS without benefit, Oyxcodone caused nausea, Hydrocodone 10mg helped, failed lower doses and Tramadol, OTC NSAIDs and tylenol, OTC creams. CT C-spine shows multilevel DDD and DJD, stenosis, no acute injury. Started Norco 10/325mg BID prn with benefit, some days could use 3, could not fill compounded cream due to cost, started Pennsaid with excellent benefit, Pennsaid denied, ordered diclofenac solution instead. Was taking Norco 10/325mg 2-3x/day with good benefit overall until he tore mesh from an umbilical hernia repair, then QID for hernia pain and working 60 hours/week. Became tolerant of Norco, rotated to Percocet 7.5/325mg QID which is less effective, then 10/325mg QID, now 5x/day prn. Cut his right forearm and posterior left calf with a circular saw.   Back Pain  Associated symptoms: no abdominal pain, no bladder incontinence, no chest pain, no fever, no numbness and no weakness    Pain  Associated symptoms include arthralgias and neck pain. Pertinent negatives include no abdominal pain, chest pain, chills, fatigue, fever, joint swelling, myalgias, nausea, numbness, vomiting or weakness.        The following portions of the patient's history were reviewed and updated as appropriate: allergies, current medications, past family history, past medical history, past social history, past surgical history and problem list.    Review of Systems   Constitutional:  Negative for chills, fatigue and fever.   HENT:  Positive  for hearing loss. Negative for trouble swallowing.    Eyes:  Negative for visual disturbance.   Respiratory:  Negative for shortness of breath.    Cardiovascular:  Negative for chest pain.   Gastrointestinal:  Negative for abdominal pain, constipation, diarrhea, nausea and vomiting.   Genitourinary:  Negative for urinary incontinence.   Musculoskeletal:  Positive for arthralgias, back pain and neck pain. Negative for joint swelling and myalgias.   Neurological:  Negative for dizziness, weakness, numbness and headache.       Objective   Physical Exam   Constitutional: He is oriented to person, place, and time. He appears well-developed and well-nourished.   HENT:   Head: Normocephalic and atraumatic.   Eyes: Pupils are equal, round, and reactive to light.   Cardiovascular: Normal rate, regular rhythm and normal heart sounds.   Pulmonary/Chest: Breath sounds normal.   Abdominal: Soft. Bowel sounds are normal. He exhibits no distension. There is no abdominal tenderness.   Musculoskeletal:      Comments: Wearing b/l CTS braces   Neurological: He is alert and oriented to person, place, and time. He has normal reflexes. He displays normal reflexes. No sensory deficit.   Psychiatric: His behavior is normal. Thought content normal.         Assessment/Plan   Diagnoses and all orders for this visit:    1. Chronic bilateral low back pain without sciatica (Primary)    2. Chronic pain of both knees    3. Degeneration of intervertebral disc of cervical region    4. Degeneration of intervertebral disc of lumbar region with discogenic back pain    5. Chronic left shoulder pain    6. Neck pain    7. Other long term (current) drug therapy    8. Pain of cervical facet joint    9. Primary osteoarthritis of both knees    10. Right wrist pain    11. Rotator cuff disorder, left    12. Chronic right shoulder pain    13. Spinal stenosis of cervical region        Inspect reviewed, in order. Low risk. Repeat UDS 8/2/22 (+) for small amount of  THC c/w use of CBD products, repeat 7/28/23, 7/10/24 in order.  Taking Percocet 10/325mg #150, filled 5/7/25. Rotate to Norco 10mg q4h prn, may be getting tolerant, will call to get refills on Norco or Percocet pending relief. Given his highly varible pain level tied to activity and injury, plan to keep on short-acting opioids to allow him to minimize opioid use as tolerated.   Added Oxycodone ER 20mg qHS, filled 7/23/21, no longer using.  Patient's pain is still well-managed by current medication regimen, is doing well at this strength and dosage, therefore I will continue to prescribe unchanged as the most appropriate course of treatment.  Increased to Tizanidine 8mg qHS, #60, prn muscle pain, insomnia.  Cont other meds as prescribed.  Referral to Ortho for L RTC tear. Had L subacromial injection.  Did not have L knee Monovisc injection, failed steroid injection in past with only 2-3 days relief.  Schedule radial right wrist injection, had nearly 100% relief with prior injection with [facility].  Cont b/l CTS braces. Refer to [facility] for surgical eval.  He promises he will present to the ED right after leaving our clinic, appears neurovascularly intact in RUE but still needs to be examined by specialist.   RTC in 3 months for f/u.

## 2025-06-03 NOTE — TELEPHONE ENCOUNTER
Pharmacist state his Oxycodone was not due to be fill again until 6/5. And they asking if you want his Hydrocodone to be refill now or wait until his due date?

## 2025-06-23 ENCOUNTER — TELEPHONE (OUTPATIENT)
Dept: PAIN MEDICINE | Facility: CLINIC | Age: 65
End: 2025-06-23
Payer: MEDICARE

## 2025-06-23 DIAGNOSIS — G89.29 CHRONIC BILATERAL LOW BACK PAIN WITHOUT SCIATICA: ICD-10-CM

## 2025-06-23 DIAGNOSIS — M54.50 CHRONIC BILATERAL LOW BACK PAIN WITHOUT SCIATICA: ICD-10-CM

## 2025-06-23 NOTE — TELEPHONE ENCOUNTER
Hub staff attempted to follow warm transfer process and was unsuccessful     Caller: Anai Nuñez    Relationship to patient: Emergency Contact    Best call back number: [phone]    Patient is needing: PATIENTS WIFE STATES SHE HAD A MISSED CALL FROM THE OFFICE BUT COULD NOT UNDERSTAND THE VOICEMAIL. NOTHING NOTED IN CHART. PLEASE CALL BACK

## 2025-06-23 NOTE — TELEPHONE ENCOUNTER
Caller: Anai Nuñez    Relationship: Emergency Contact    Best call back number: [phone]    Who are you requesting to speak with (clinical staff, provider,  specific staff member): DR. BENITES OR MA    What was the call regarding: PATIENT WIFE CALLING IN TO PROVIDE DR. BENITES WITH AN UPDATE AND SHE STATED THE NEW MEDICATION IS WORKING    ALSO SHE STATED THEY WILL USE THIS PHARMACY FOR THE NEXT MONTH FOR REFILL- IF YOU NEED TO CALL BACK THAT'S OKAY AND IF NOT THAT'S OKAY TOO.    [id number] - [place], [place] - [address] [phone] [facility] [phone] FX

## 2025-06-25 NOTE — TELEPHONE ENCOUNTER
=  Caller: Anai Nuñez    Relationship to patient: Emergency Contact    Best call back number: [phone]    Patient is needing: PT'S WIFE IS CALLING TO SPEAK WITH SOMEONE REGARDING THE PT'S RX REFILLS NEXT MONTH.. PLEASE ADVISE..    =

## 2025-06-30 DIAGNOSIS — M54.50 CHRONIC BILATERAL LOW BACK PAIN WITHOUT SCIATICA: ICD-10-CM

## 2025-06-30 DIAGNOSIS — G89.29 CHRONIC BILATERAL LOW BACK PAIN WITHOUT SCIATICA: ICD-10-CM

## 2025-06-30 RX ORDER — HYDROCODONE BITARTRATE AND ACETAMINOPHEN 10; 325 MG/1; MG/1
1 TABLET ORAL EVERY 4 HOURS PRN
Qty: 180 TABLET | Refills: 0 | Status: SHIPPED | OUTPATIENT
Start: 2025-06-30

## 2025-06-30 RX ORDER — OXYCODONE AND ACETAMINOPHEN 10; 325 MG/1; MG/1
TABLET ORAL
Qty: 150 TABLET | Refills: 0 | OUTPATIENT
Start: 2025-06-30

## 2025-06-30 RX ORDER — OXYCODONE AND ACETAMINOPHEN 10; 325 MG/1; MG/1
1 TABLET ORAL
Qty: 150 TABLET | Refills: 0 | OUTPATIENT
Start: 2025-06-30

## 2025-06-30 NOTE — TELEPHONE ENCOUNTER
Caller: Anai Nuñez    Relationship: Emergency Contact    Best call back number: [phone]     Requested Prescriptions:   Requested Prescriptions     Pending Prescriptions Disp Refills    oxyCODONE-acetaminophen (Percocet)  MG per tablet 150 tablet 0     Sig: Take 1 tablet by mouth 5 (Five) Times a Day As Needed for Moderate Pain.        Pharmacy where request should be sent: [id number] [address] [facility] [phone] [facility] [phone]      Last office visit with prescribing clinician: 6/3/2025   Last telemedicine visit with prescribing clinician: Visit date not found   Next office visit with prescribing clinician: 9/5/2025     Additional details provided by patient: NA    Does the patient have less than a 3 day supply:  [x] Yes  [] No    Would you like a call back once the refill request has been completed: [x] Yes [] No    If the office needs to give you a call back, can they leave a voicemail: [x] Yes [] No    Rudi Magaña Rep   06/30/25 12:21 EDT

## 2025-07-31 ENCOUNTER — TELEPHONE (OUTPATIENT)
Dept: PAIN MEDICINE | Facility: CLINIC | Age: 65
End: 2025-07-31
Payer: MEDICARE

## 2025-07-31 NOTE — TELEPHONE ENCOUNTER
Caller: Christopher Nuñez    Relationship to patient: Self    Best call back number: [phone]    Type of visit: R HIP INJECTION    Requested date: ASAP     Additional notes: PT'S WIFE STATES THEY HAVE BEEN WAITING SINCE JUNE TO SCHEDULE. PLEASE CONTACT PT'S WIFE ABOVE TO DISCUSS.

## 2025-08-28 DIAGNOSIS — G89.29 CHRONIC BILATERAL LOW BACK PAIN WITHOUT SCIATICA: ICD-10-CM

## 2025-08-28 DIAGNOSIS — M54.50 CHRONIC BILATERAL LOW BACK PAIN WITHOUT SCIATICA: ICD-10-CM

## 2025-08-29 RX ORDER — HYDROCODONE BITARTRATE AND ACETAMINOPHEN 10; 325 MG/1; MG/1
1 TABLET ORAL EVERY 4 HOURS PRN
Qty: 180 TABLET | Refills: 0 | Status: SHIPPED | OUTPATIENT
Start: 2025-08-29